# Patient Record
Sex: FEMALE | Race: BLACK OR AFRICAN AMERICAN | NOT HISPANIC OR LATINO | Employment: OTHER | URBAN - NONMETROPOLITAN AREA
[De-identification: names, ages, dates, MRNs, and addresses within clinical notes are randomized per-mention and may not be internally consistent; named-entity substitution may affect disease eponyms.]

---

## 2017-04-17 ENCOUNTER — OFFICE VISIT (OUTPATIENT)
Dept: NEUROSURGERY | Facility: CLINIC | Age: 72
End: 2017-04-17

## 2017-04-17 VITALS
SYSTOLIC BLOOD PRESSURE: 140 MMHG | HEIGHT: 63 IN | WEIGHT: 165 LBS | DIASTOLIC BLOOD PRESSURE: 78 MMHG | BODY MASS INDEX: 29.23 KG/M2

## 2017-04-17 DIAGNOSIS — Z78.9 NONSMOKER: ICD-10-CM

## 2017-04-17 DIAGNOSIS — M54.12 CERVICAL RADICULOPATHY: ICD-10-CM

## 2017-04-17 DIAGNOSIS — M47.812 CERVICAL SPONDYLOSIS WITHOUT MYELOPATHY: Primary | ICD-10-CM

## 2017-04-17 DIAGNOSIS — M50.30 DEGENERATION OF CERVICAL INTERVERTEBRAL DISC: ICD-10-CM

## 2017-04-17 PROCEDURE — 99203 OFFICE O/P NEW LOW 30 MIN: CPT | Performed by: NEUROLOGICAL SURGERY

## 2017-04-17 RX ORDER — VALSARTAN AND HYDROCHLOROTHIAZIDE 320; 25 MG/1; MG/1
1 TABLET, FILM COATED ORAL DAILY
COMMUNITY
Start: 2017-03-11

## 2017-04-17 RX ORDER — POTASSIUM CHLORIDE 750 MG/1
10 TABLET, EXTENDED RELEASE ORAL DAILY
COMMUNITY
Start: 2017-03-11 | End: 2020-12-21

## 2017-04-17 RX ORDER — FENOFIBRATE 160 MG/1
160 TABLET ORAL DAILY
COMMUNITY
Start: 2017-03-11 | End: 2018-05-03

## 2017-04-17 NOTE — PROGRESS NOTES
Patient: Arnie Ross  : 1945    Primary Care Provider: Nando Pope MD    Referring Provider: Nando Pope MD         History    Chief Complaint: [] Neck pain and left upper trapezius pain.  This developed about 1 year ago.  She was getting into a van and [place] which was a shuttle for their use on her car was being repaired apparently she bumped her head on the door.  She has had 2 episodes of physical therapy.  Some of which helped especially the traction.  She has no further radicular pain and is not aware of weakness or numbness.  She has had no history of any other injury.  Pain that comes around to the right side of the face less pain to the back of the head.  An MRI was done in [place].  I see that on a CD.  There  is spur disc complex C5 6 to the left with some narrowing of foramen                                                                                                                                                                                                                                                                                                                                                                                                                                                                                                               History of Present Illness: []    Review of Systems   Constitutional: Negative.    Eyes: Negative.         Glasses, prior cataract  op   Respiratory: Negative.    Cardiovascular: Negative.    Gastrointestinal: Negative.    Endocrine: Negative.    Genitourinary: Negative.    Musculoskeletal: Positive for arthralgias and neck pain.   Skin: Negative.    Allergic/Immunologic: Negative.    Neurological: Positive for headaches.   Hematological: Negative.    Psychiatric/Behavioral: Negative.    All other systems reviewed and are negative.      Past Medical History:   Diagnosis Date   • Hypertension        Family History   Problem  Relation Age of Onset   • Cancer Sister    • Diabetes Sister    • Cancer Brother        Social History   Substance Use Topics   • Smoking status: Never Smoker   • Smokeless tobacco: None   • Alcohol use No       Past Surgical History:   Procedure Laterality Date   • CATARACT EXTRACTION     • D&C CERVICAL BIOPSY      X 2   • GALLBLADDER SURGERY  2011       Augmentin [amoxicillin-pot clavulanate]; Codeine; and Sulfa antibiotics      Physical Exam:   [] awake alert and oriented properly she is well dressed.  Pleasant.  Chest clear heart regular no murmur or bruit.  Oral airway patent.  She has impaired range of motion lateral flexion of neck seemingly worse lateral rotation to right.  No palpable spasm or tenderness.  Full range of motion of the shoulders.  Cranial nerves II through XII intact.  Very mild weakness of pronation and brachial radialis on the left compared to the right.  Deep tendon reflexes are 1-2+ and equal biceps brachial radialis triceps knees and ankles.  Sensory intact.  Cerebellar function intact.      Medical Decision Making    Data Review:   []MRI from [facility] cervical spine.  Spur disc complex C5 6 left     Diagnosis: cervical spondylosis she does not have symptoms of strong radiculopathy.  She did improve previously with cervical traction.    []    Treatment Options:   []see above    1. Cervical spondylosis without myelopathy    2. Degeneration of cervical intervertebral disc    3. Cervical radiculopathy    4. Nonsmoker

## 2017-06-21 ENCOUNTER — OFFICE VISIT (OUTPATIENT)
Dept: NEUROSURGERY | Facility: CLINIC | Age: 72
End: 2017-06-21

## 2017-06-21 VITALS
WEIGHT: 168 LBS | SYSTOLIC BLOOD PRESSURE: 140 MMHG | HEIGHT: 63 IN | DIASTOLIC BLOOD PRESSURE: 70 MMHG | BODY MASS INDEX: 29.77 KG/M2

## 2017-06-21 DIAGNOSIS — M54.12 CERVICAL RADICULOPATHY: ICD-10-CM

## 2017-06-21 DIAGNOSIS — Z78.9 NONSMOKER: ICD-10-CM

## 2017-06-21 DIAGNOSIS — M50.30 DEGENERATION OF CERVICAL INTERVERTEBRAL DISC: Primary | ICD-10-CM

## 2017-06-21 DIAGNOSIS — M47.812 CERVICAL SPONDYLOSIS WITHOUT MYELOPATHY: ICD-10-CM

## 2017-06-21 PROCEDURE — 99212 OFFICE O/P EST SF 10 MIN: CPT | Performed by: NEUROLOGICAL SURGERY

## 2017-06-21 NOTE — PROGRESS NOTES
Patient ID: Arnie Ross is a 72 y.o. female here today for []follow-up for neck pain.    HPI:  []She originally had symptoms towards the left shoulder.  That has improved with exercise program and therapy.  An MRI was reviewed.  She had a tiny spur disc complex C5 6 to the left not compressing the cord nor the exiting nerve root .  She is improved    The following portions of the patient's history were reviewed and updated as appropriate: allergies, current medications, past family history, past medical history, past social history, past surgical history and problem list.    Review of Systems   Constitutional: Negative.    Eyes: Negative.    Respiratory: Negative.    Cardiovascular: Negative.    Gastrointestinal: Negative.    Endocrine: Negative.    Genitourinary: Negative.    Musculoskeletal: Positive for arthralgias and neck pain.   Skin: Negative.    Allergic/Immunologic: Negative.    Neurological: Positive for headaches.   Hematological: Negative.    Psychiatric/Behavioral: Negative.    All other systems reviewed and are negative.      Past Medical History:   Diagnosis Date   • Hypertension        Family History   Problem Relation Age of Onset   • Cancer Sister    • Diabetes Sister    • Cancer Brother        Social History   Substance Use Topics   • Smoking status: Never Smoker   • Smokeless tobacco: None   • Alcohol use No       Past Surgical History:   Procedure Laterality Date   • CATARACT EXTRACTION     • D&C CERVICAL BIOPSY      X 2   • GALLBLADDER SURGERY  2011       Augmentin [amoxicillin-pot clavulanate]; Codeine; and Sulfa antibiotics      Physical Exam:  []  Awake alert oriented properly.  Chest clear heart regular.  She has very good range of motion of her neck and shoulders today.  Good equal strength reflexes 1+ and equal no sensory complaints  Review of Radiographic Studies:  []I reviewed the MRI again described above of the cervical spine    Medical Decision Making:  []  She is improved she  does not have a condition that needs any surgical intervention she understands how to take care of her manage her neck properly  1. Degeneration of cervical intervertebral disc    2. Cervical spondylosis without myelopathy    3. Cervical radiculopathy    4. Nonsmoker         Arnie was seen today for neck pain.    Diagnoses and all orders for this visit:    Degeneration of cervical intervertebral disc    Cervical spondylosis without myelopathy    Cervical radiculopathy    Nonsmoker        Return if symptoms worsen or fail to improve.

## 2018-02-26 ENCOUNTER — HOSPITAL ENCOUNTER (INPATIENT)
Facility: HOSPITAL | Age: 73
LOS: 9 days | Discharge: HOME | End: 2018-03-07
Attending: FAMILY MEDICINE | Admitting: SPECIALIST

## 2018-02-26 ENCOUNTER — APPOINTMENT (OUTPATIENT)
Dept: CT IMAGING | Facility: HOSPITAL | Age: 73
End: 2018-02-26

## 2018-02-26 DIAGNOSIS — K35.32 APPENDICITIS WITH PERFORATION: Primary | ICD-10-CM

## 2018-02-26 LAB
ALBUMIN SERPL-MCNC: 3.9 G/DL (ref 3.5–5)
ALBUMIN/GLOB SERPL: 1 G/DL (ref 1.1–2.5)
ALP SERPL-CCNC: 104 U/L (ref 24–120)
ALT SERPL W P-5'-P-CCNC: 16 U/L (ref 0–54)
AMYLASE SERPL-CCNC: 57 U/L (ref 30–110)
ANION GAP SERPL CALCULATED.3IONS-SCNC: 11 MMOL/L (ref 4–13)
AST SERPL-CCNC: 41 U/L (ref 7–45)
BASOPHILS # BLD AUTO: 0.03 10*3/MM3 (ref 0–0.2)
BASOPHILS NFR BLD AUTO: 0.2 % (ref 0–2)
BILIRUB SERPL-MCNC: 1 MG/DL (ref 0.1–1)
BILIRUB UR QL STRIP: NEGATIVE
BUN BLD-MCNC: 13 MG/DL (ref 5–21)
BUN/CREAT SERPL: 13.1 (ref 7–25)
CALCIUM SPEC-SCNC: 9.8 MG/DL (ref 8.4–10.4)
CHLORIDE SERPL-SCNC: 99 MMOL/L (ref 98–110)
CLARITY UR: CLEAR
CO2 SERPL-SCNC: 29 MMOL/L (ref 24–31)
COLOR UR: YELLOW
CREAT BLD-MCNC: 0.99 MG/DL (ref 0.5–1.4)
DEPRECATED RDW RBC AUTO: 39.8 FL (ref 40–54)
EOSINOPHIL # BLD AUTO: 0.06 10*3/MM3 (ref 0–0.7)
EOSINOPHIL NFR BLD AUTO: 0.4 % (ref 0–4)
ERYTHROCYTE [DISTWIDTH] IN BLOOD BY AUTOMATED COUNT: 11.8 % (ref 12–15)
GFR SERPL CREATININE-BSD FRML MDRD: 67 ML/MIN/1.73
GLOBULIN UR ELPH-MCNC: 4.1 GM/DL
GLUCOSE BLD-MCNC: 109 MG/DL (ref 70–100)
GLUCOSE UR STRIP-MCNC: NEGATIVE MG/DL
HCT VFR BLD AUTO: 32 % (ref 37–47)
HGB BLD-MCNC: 10.9 G/DL (ref 12–16)
HGB UR QL STRIP.AUTO: NEGATIVE
IMM GRANULOCYTES # BLD: 0.05 10*3/MM3 (ref 0–0.03)
IMM GRANULOCYTES NFR BLD: 0.3 % (ref 0–5)
KETONES UR QL STRIP: NEGATIVE
LEUKOCYTE ESTERASE UR QL STRIP.AUTO: NEGATIVE
LIPASE SERPL-CCNC: 37 U/L (ref 23–203)
LYMPHOCYTES # BLD AUTO: 1.91 10*3/MM3 (ref 0.72–4.86)
LYMPHOCYTES NFR BLD AUTO: 13.4 % (ref 15–45)
MCH RBC QN AUTO: 31.2 PG (ref 28–32)
MCHC RBC AUTO-ENTMCNC: 34.1 G/DL (ref 33–36)
MCV RBC AUTO: 91.7 FL (ref 82–98)
MONOCYTES # BLD AUTO: 1.14 10*3/MM3 (ref 0.19–1.3)
MONOCYTES NFR BLD AUTO: 8 % (ref 4–12)
NEUTROPHILS # BLD AUTO: 11.1 10*3/MM3 (ref 1.87–8.4)
NEUTROPHILS NFR BLD AUTO: 77.7 % (ref 39–78)
NITRITE UR QL STRIP: NEGATIVE
NRBC BLD MANUAL-RTO: 0 /100 WBC (ref 0–0)
PH UR STRIP.AUTO: 6 [PH] (ref 5–8)
PLATELET # BLD AUTO: 411 10*3/MM3 (ref 130–400)
PMV BLD AUTO: 10.1 FL (ref 6–12)
POTASSIUM BLD-SCNC: 3.9 MMOL/L (ref 3.5–5.3)
PROT SERPL-MCNC: 8 G/DL (ref 6.3–8.7)
PROT UR QL STRIP: NEGATIVE
RBC # BLD AUTO: 3.49 10*6/MM3 (ref 4.2–5.4)
SODIUM BLD-SCNC: 139 MMOL/L (ref 135–145)
SP GR UR STRIP: 1.03 (ref 1–1.03)
UROBILINOGEN UR QL STRIP: NORMAL
WBC NRBC COR # BLD: 14.29 10*3/MM3 (ref 4.8–10.8)

## 2018-02-26 PROCEDURE — 80053 COMPREHEN METABOLIC PANEL: CPT | Performed by: EMERGENCY MEDICINE

## 2018-02-26 PROCEDURE — 83690 ASSAY OF LIPASE: CPT | Performed by: EMERGENCY MEDICINE

## 2018-02-26 PROCEDURE — 81003 URINALYSIS AUTO W/O SCOPE: CPT | Performed by: EMERGENCY MEDICINE

## 2018-02-26 PROCEDURE — 85025 COMPLETE CBC W/AUTO DIFF WBC: CPT | Performed by: EMERGENCY MEDICINE

## 2018-02-26 PROCEDURE — 25010000002 MORPHINE PER 10 MG: Performed by: EMERGENCY MEDICINE

## 2018-02-26 PROCEDURE — 94799 UNLISTED PULMONARY SVC/PX: CPT

## 2018-02-26 PROCEDURE — 74176 CT ABD & PELVIS W/O CONTRAST: CPT

## 2018-02-26 PROCEDURE — 25010000002 ERTAPENEM PER 500 MG: Performed by: SPECIALIST

## 2018-02-26 PROCEDURE — 25010000002 ONDANSETRON PER 1 MG: Performed by: EMERGENCY MEDICINE

## 2018-02-26 PROCEDURE — 82150 ASSAY OF AMYLASE: CPT | Performed by: EMERGENCY MEDICINE

## 2018-02-26 PROCEDURE — 99284 EMERGENCY DEPT VISIT MOD MDM: CPT

## 2018-02-26 RX ORDER — CIPROFLOXACIN 250 MG/1
250 TABLET, FILM COATED ORAL 2 TIMES DAILY
Status: ON HOLD | COMMUNITY
End: 2018-03-01

## 2018-02-26 RX ORDER — MELATONIN
2000 DAILY
COMMUNITY

## 2018-02-26 RX ORDER — SODIUM CHLORIDE 9 MG/ML
125 INJECTION, SOLUTION INTRAVENOUS CONTINUOUS
Status: DISCONTINUED | OUTPATIENT
Start: 2018-02-26 | End: 2018-02-26

## 2018-02-26 RX ORDER — ONDANSETRON 2 MG/ML
4 INJECTION INTRAMUSCULAR; INTRAVENOUS EVERY 4 HOURS PRN
Status: DISCONTINUED | OUTPATIENT
Start: 2018-02-26 | End: 2018-03-07 | Stop reason: HOSPADM

## 2018-02-26 RX ORDER — ONDANSETRON 2 MG/ML
4 INJECTION INTRAMUSCULAR; INTRAVENOUS ONCE
Status: COMPLETED | OUTPATIENT
Start: 2018-02-26 | End: 2018-02-26

## 2018-02-26 RX ORDER — MORPHINE SULFATE 4 MG/ML
4 INJECTION, SOLUTION INTRAMUSCULAR; INTRAVENOUS EVERY 4 HOURS PRN
Status: DISCONTINUED | OUTPATIENT
Start: 2018-02-26 | End: 2018-02-27

## 2018-02-26 RX ORDER — ASPIRIN 81 MG/1
81 TABLET ORAL NIGHTLY
COMMUNITY

## 2018-02-26 RX ORDER — SODIUM CHLORIDE, SODIUM LACTATE, POTASSIUM CHLORIDE, CALCIUM CHLORIDE 600; 310; 30; 20 MG/100ML; MG/100ML; MG/100ML; MG/100ML
50 INJECTION, SOLUTION INTRAVENOUS CONTINUOUS
Status: DISCONTINUED | OUTPATIENT
Start: 2018-02-26 | End: 2018-03-05

## 2018-02-26 RX ORDER — VITAMIN E 268 MG
400 CAPSULE ORAL DAILY
COMMUNITY

## 2018-02-26 RX ORDER — CHLORAL HYDRATE 500 MG
1000 CAPSULE ORAL
COMMUNITY
End: 2020-12-21

## 2018-02-26 RX ORDER — VALSARTAN AND HYDROCHLOROTHIAZIDE 160; 12.5 MG/1; MG/1
1 TABLET, FILM COATED ORAL DAILY
Status: DISCONTINUED | OUTPATIENT
Start: 2018-02-27 | End: 2018-02-27

## 2018-02-26 RX ORDER — NAPROXEN 500 MG/1
500 TABLET ORAL 2 TIMES DAILY PRN
COMMUNITY
End: 2020-12-21

## 2018-02-26 RX ORDER — MORPHINE SULFATE 4 MG/ML
4 INJECTION, SOLUTION INTRAMUSCULAR; INTRAVENOUS ONCE
Status: COMPLETED | OUTPATIENT
Start: 2018-02-26 | End: 2018-02-26

## 2018-02-26 RX ORDER — FEXOFENADINE HCL AND PSEUDOEPHEDRINE HCI 60; 120 MG/1; MG/1
1 TABLET, EXTENDED RELEASE ORAL EVERY 12 HOURS PRN
COMMUNITY

## 2018-02-26 RX ADMIN — SODIUM CHLORIDE, POTASSIUM CHLORIDE, SODIUM LACTATE AND CALCIUM CHLORIDE 125 ML/HR: 600; 310; 30; 20 INJECTION, SOLUTION INTRAVENOUS at 22:17

## 2018-02-26 RX ADMIN — SODIUM CHLORIDE 1 G: 900 INJECTION INTRAVENOUS at 20:30

## 2018-02-26 RX ADMIN — SODIUM CHLORIDE 125 ML/HR: 9 INJECTION, SOLUTION INTRAVENOUS at 19:11

## 2018-02-26 RX ADMIN — ONDANSETRON 4 MG: 2 INJECTION INTRAMUSCULAR; INTRAVENOUS at 20:44

## 2018-02-26 RX ADMIN — MORPHINE SULFATE 4 MG: 4 INJECTION, SOLUTION INTRAMUSCULAR; INTRAVENOUS at 20:44

## 2018-02-26 RX ADMIN — METRONIDAZOLE 500 MG: 500 INJECTION, SOLUTION INTRAVENOUS at 22:19

## 2018-02-27 LAB
ANION GAP SERPL CALCULATED.3IONS-SCNC: 13 MMOL/L (ref 4–13)
BUN BLD-MCNC: 13 MG/DL (ref 5–21)
BUN/CREAT SERPL: 13.3 (ref 7–25)
CALCIUM SPEC-SCNC: 9 MG/DL (ref 8.4–10.4)
CHLORIDE SERPL-SCNC: 101 MMOL/L (ref 98–110)
CO2 SERPL-SCNC: 28 MMOL/L (ref 24–31)
CREAT BLD-MCNC: 0.98 MG/DL (ref 0.5–1.4)
DEPRECATED RDW RBC AUTO: 40 FL (ref 40–54)
ERYTHROCYTE [DISTWIDTH] IN BLOOD BY AUTOMATED COUNT: 11.8 % (ref 12–15)
GFR SERPL CREATININE-BSD FRML MDRD: 68 ML/MIN/1.73
GLUCOSE BLD-MCNC: 101 MG/DL (ref 70–100)
HCT VFR BLD AUTO: 28.4 % (ref 37–47)
HGB BLD-MCNC: 9.6 G/DL (ref 12–16)
MAGNESIUM SERPL-MCNC: 2.2 MG/DL (ref 1.4–2.2)
MCH RBC QN AUTO: 31.6 PG (ref 28–32)
MCHC RBC AUTO-ENTMCNC: 33.8 G/DL (ref 33–36)
MCV RBC AUTO: 93.4 FL (ref 82–98)
PHOSPHATE SERPL-MCNC: 4.5 MG/DL (ref 2.5–4.5)
PLATELET # BLD AUTO: 370 10*3/MM3 (ref 130–400)
PMV BLD AUTO: 10 FL (ref 6–12)
POTASSIUM BLD-SCNC: 4.2 MMOL/L (ref 3.5–5.3)
RBC # BLD AUTO: 3.04 10*6/MM3 (ref 4.2–5.4)
SODIUM BLD-SCNC: 142 MMOL/L (ref 135–145)
WBC NRBC COR # BLD: 14.61 10*3/MM3 (ref 4.8–10.8)

## 2018-02-27 PROCEDURE — 80048 BASIC METABOLIC PNL TOTAL CA: CPT | Performed by: SPECIALIST

## 2018-02-27 PROCEDURE — 85027 COMPLETE CBC AUTOMATED: CPT | Performed by: SPECIALIST

## 2018-02-27 PROCEDURE — 84100 ASSAY OF PHOSPHORUS: CPT | Performed by: SPECIALIST

## 2018-02-27 PROCEDURE — 25010000002 MORPHINE PER 10 MG: Performed by: SPECIALIST

## 2018-02-27 PROCEDURE — 25010000002 ONDANSETRON PER 1 MG: Performed by: SPECIALIST

## 2018-02-27 PROCEDURE — 25010000002 ERTAPENEM PER 500 MG: Performed by: SPECIALIST

## 2018-02-27 PROCEDURE — 83735 ASSAY OF MAGNESIUM: CPT | Performed by: SPECIALIST

## 2018-02-27 PROCEDURE — 36415 COLL VENOUS BLD VENIPUNCTURE: CPT | Performed by: SPECIALIST

## 2018-02-27 RX ORDER — MORPHINE SULFATE 4 MG/ML
4 INJECTION, SOLUTION INTRAMUSCULAR; INTRAVENOUS
Status: DISCONTINUED | OUTPATIENT
Start: 2018-02-27 | End: 2018-03-07 | Stop reason: HOSPADM

## 2018-02-27 RX ADMIN — ONDANSETRON 4 MG: 2 INJECTION INTRAMUSCULAR; INTRAVENOUS at 12:35

## 2018-02-27 RX ADMIN — METRONIDAZOLE 500 MG: 500 INJECTION, SOLUTION INTRAVENOUS at 08:22

## 2018-02-27 RX ADMIN — ONDANSETRON 4 MG: 2 INJECTION INTRAMUSCULAR; INTRAVENOUS at 08:17

## 2018-02-27 RX ADMIN — SODIUM CHLORIDE, POTASSIUM CHLORIDE, SODIUM LACTATE AND CALCIUM CHLORIDE 125 ML/HR: 600; 310; 30; 20 INJECTION, SOLUTION INTRAVENOUS at 15:52

## 2018-02-27 RX ADMIN — ONDANSETRON 4 MG: 2 INJECTION INTRAMUSCULAR; INTRAVENOUS at 18:01

## 2018-02-27 RX ADMIN — METRONIDAZOLE 500 MG: 500 INJECTION, SOLUTION INTRAVENOUS at 15:53

## 2018-02-27 RX ADMIN — METRONIDAZOLE 500 MG: 500 INJECTION, SOLUTION INTRAVENOUS at 21:31

## 2018-02-27 RX ADMIN — SODIUM CHLORIDE, POTASSIUM CHLORIDE, SODIUM LACTATE AND CALCIUM CHLORIDE 125 ML/HR: 600; 310; 30; 20 INJECTION, SOLUTION INTRAVENOUS at 08:22

## 2018-02-27 RX ADMIN — SODIUM CHLORIDE, POTASSIUM CHLORIDE, SODIUM LACTATE AND CALCIUM CHLORIDE 1000 ML: 600; 310; 30; 20 INJECTION, SOLUTION INTRAVENOUS at 14:10

## 2018-02-27 RX ADMIN — MORPHINE SULFATE 4 MG: 4 INJECTION, SOLUTION INTRAMUSCULAR; INTRAVENOUS at 08:17

## 2018-02-27 RX ADMIN — SODIUM CHLORIDE 1 G: 900 INJECTION INTRAVENOUS at 19:45

## 2018-02-27 RX ADMIN — METRONIDAZOLE 500 MG: 500 INJECTION, SOLUTION INTRAVENOUS at 03:46

## 2018-02-28 LAB
ANION GAP SERPL CALCULATED.3IONS-SCNC: 15 MMOL/L (ref 4–13)
BUN BLD-MCNC: 14 MG/DL (ref 5–21)
BUN/CREAT SERPL: 18.2 (ref 7–25)
CALCIUM SPEC-SCNC: 9.1 MG/DL (ref 8.4–10.4)
CHLORIDE SERPL-SCNC: 105 MMOL/L (ref 98–110)
CO2 SERPL-SCNC: 21 MMOL/L (ref 24–31)
CREAT BLD-MCNC: 0.77 MG/DL (ref 0.5–1.4)
DEPRECATED RDW RBC AUTO: 41 FL (ref 40–54)
ERYTHROCYTE [DISTWIDTH] IN BLOOD BY AUTOMATED COUNT: 11.9 % (ref 12–15)
GFR SERPL CREATININE-BSD FRML MDRD: 90 ML/MIN/1.73
GLUCOSE BLD-MCNC: 73 MG/DL (ref 70–100)
HCT VFR BLD AUTO: 28.1 % (ref 37–47)
HGB BLD-MCNC: 9.3 G/DL (ref 12–16)
MCH RBC QN AUTO: 31.3 PG (ref 28–32)
MCHC RBC AUTO-ENTMCNC: 33.1 G/DL (ref 33–36)
MCV RBC AUTO: 94.6 FL (ref 82–98)
PLATELET # BLD AUTO: 344 10*3/MM3 (ref 130–400)
PMV BLD AUTO: 11.1 FL (ref 6–12)
POTASSIUM BLD-SCNC: 4.2 MMOL/L (ref 3.5–5.3)
RBC # BLD AUTO: 2.97 10*6/MM3 (ref 4.2–5.4)
SODIUM BLD-SCNC: 141 MMOL/L (ref 135–145)
WBC NRBC COR # BLD: 14.23 10*3/MM3 (ref 4.8–10.8)

## 2018-02-28 PROCEDURE — 25010000002 MORPHINE PER 10 MG: Performed by: SPECIALIST

## 2018-02-28 PROCEDURE — 25010000002 ENOXAPARIN PER 10 MG: Performed by: SPECIALIST

## 2018-02-28 PROCEDURE — 85027 COMPLETE CBC AUTOMATED: CPT | Performed by: SPECIALIST

## 2018-02-28 PROCEDURE — 36415 COLL VENOUS BLD VENIPUNCTURE: CPT | Performed by: SPECIALIST

## 2018-02-28 PROCEDURE — 80048 BASIC METABOLIC PNL TOTAL CA: CPT | Performed by: SPECIALIST

## 2018-02-28 PROCEDURE — 25010000002 ERTAPENEM PER 500 MG: Performed by: SPECIALIST

## 2018-02-28 RX ORDER — SUCRALFATE ORAL 1 G/10ML
1 SUSPENSION ORAL EVERY 6 HOURS SCHEDULED
Status: DISCONTINUED | OUTPATIENT
Start: 2018-03-01 | End: 2018-03-05

## 2018-02-28 RX ORDER — SIMETHICONE 80 MG
80 TABLET,CHEWABLE ORAL 4 TIMES DAILY PRN
Status: DISCONTINUED | OUTPATIENT
Start: 2018-02-28 | End: 2018-03-05

## 2018-02-28 RX ORDER — MAGNESIUM HYDROXIDE/ALUMINUM HYDROXICE/SIMETHICONE 120; 1200; 1200 MG/30ML; MG/30ML; MG/30ML
20 SUSPENSION ORAL EVERY 6 HOURS PRN
Status: DISCONTINUED | OUTPATIENT
Start: 2018-02-28 | End: 2018-03-05

## 2018-02-28 RX ORDER — FAMOTIDINE 10 MG/ML
20 INJECTION, SOLUTION INTRAVENOUS EVERY 12 HOURS SCHEDULED
Status: DISCONTINUED | OUTPATIENT
Start: 2018-02-28 | End: 2018-03-05 | Stop reason: SDUPTHER

## 2018-02-28 RX ORDER — FAMOTIDINE 20 MG/1
20 TABLET, FILM COATED ORAL EVERY 12 HOURS SCHEDULED
Status: DISCONTINUED | OUTPATIENT
Start: 2018-02-28 | End: 2018-03-01

## 2018-02-28 RX ADMIN — SODIUM CHLORIDE, POTASSIUM CHLORIDE, SODIUM LACTATE AND CALCIUM CHLORIDE 125 ML/HR: 600; 310; 30; 20 INJECTION, SOLUTION INTRAVENOUS at 15:22

## 2018-02-28 RX ADMIN — ENOXAPARIN SODIUM 40 MG: 100 INJECTION SUBCUTANEOUS at 05:15

## 2018-02-28 RX ADMIN — METRONIDAZOLE 500 MG: 500 INJECTION, SOLUTION INTRAVENOUS at 22:23

## 2018-02-28 RX ADMIN — FAMOTIDINE 20 MG: 10 INJECTION INTRAVENOUS at 22:24

## 2018-02-28 RX ADMIN — SODIUM CHLORIDE 1 G: 900 INJECTION INTRAVENOUS at 20:41

## 2018-02-28 RX ADMIN — MORPHINE SULFATE 4 MG: 4 INJECTION, SOLUTION INTRAMUSCULAR; INTRAVENOUS at 11:01

## 2018-02-28 RX ADMIN — SODIUM CHLORIDE, POTASSIUM CHLORIDE, SODIUM LACTATE AND CALCIUM CHLORIDE 125 ML/HR: 600; 310; 30; 20 INJECTION, SOLUTION INTRAVENOUS at 03:27

## 2018-02-28 RX ADMIN — METRONIDAZOLE 500 MG: 500 INJECTION, SOLUTION INTRAVENOUS at 03:27

## 2018-02-28 RX ADMIN — METRONIDAZOLE 500 MG: 500 INJECTION, SOLUTION INTRAVENOUS at 15:22

## 2018-02-28 RX ADMIN — METRONIDAZOLE 500 MG: 500 INJECTION, SOLUTION INTRAVENOUS at 08:55

## 2018-03-01 LAB
ANION GAP SERPL CALCULATED.3IONS-SCNC: 15 MMOL/L (ref 4–13)
BUN BLD-MCNC: 16 MG/DL (ref 5–21)
BUN/CREAT SERPL: 24.2 (ref 7–25)
CALCIUM SPEC-SCNC: 8.9 MG/DL (ref 8.4–10.4)
CHLORIDE SERPL-SCNC: 104 MMOL/L (ref 98–110)
CO2 SERPL-SCNC: 24 MMOL/L (ref 24–31)
CREAT BLD-MCNC: 0.66 MG/DL (ref 0.5–1.4)
DEPRECATED RDW RBC AUTO: 41.1 FL (ref 40–54)
ERYTHROCYTE [DISTWIDTH] IN BLOOD BY AUTOMATED COUNT: 11.9 % (ref 12–15)
GFR SERPL CREATININE-BSD FRML MDRD: 107 ML/MIN/1.73
GLUCOSE BLD-MCNC: 88 MG/DL (ref 70–100)
HCT VFR BLD AUTO: 26.3 % (ref 37–47)
HGB BLD-MCNC: 8.8 G/DL (ref 12–16)
MCH RBC QN AUTO: 31 PG (ref 28–32)
MCHC RBC AUTO-ENTMCNC: 33.5 G/DL (ref 33–36)
MCV RBC AUTO: 92.6 FL (ref 82–98)
PLATELET # BLD AUTO: 386 10*3/MM3 (ref 130–400)
PMV BLD AUTO: 10.9 FL (ref 6–12)
POTASSIUM BLD-SCNC: 3.6 MMOL/L (ref 3.5–5.3)
RBC # BLD AUTO: 2.84 10*6/MM3 (ref 4.2–5.4)
SODIUM BLD-SCNC: 143 MMOL/L (ref 135–145)
WBC NRBC COR # BLD: 12.91 10*3/MM3 (ref 4.8–10.8)

## 2018-03-01 PROCEDURE — 25010000002 ERTAPENEM PER 500 MG: Performed by: SPECIALIST

## 2018-03-01 PROCEDURE — 25010000002 ENOXAPARIN PER 10 MG: Performed by: SPECIALIST

## 2018-03-01 PROCEDURE — 25010000002 MORPHINE PER 10 MG: Performed by: SPECIALIST

## 2018-03-01 PROCEDURE — 85027 COMPLETE CBC AUTOMATED: CPT | Performed by: SPECIALIST

## 2018-03-01 PROCEDURE — 02HV33Z INSERTION OF INFUSION DEVICE INTO SUPERIOR VENA CAVA, PERCUTANEOUS APPROACH: ICD-10-PCS | Performed by: SPECIALIST

## 2018-03-01 PROCEDURE — C1751 CATH, INF, PER/CENT/MIDLINE: HCPCS

## 2018-03-01 PROCEDURE — 80048 BASIC METABOLIC PNL TOTAL CA: CPT | Performed by: SPECIALIST

## 2018-03-01 PROCEDURE — 25010000002 ONDANSETRON PER 1 MG: Performed by: SPECIALIST

## 2018-03-01 RX ORDER — PHENOL 1.4 %
600 AEROSOL, SPRAY (ML) MUCOUS MEMBRANE DAILY
COMMUNITY

## 2018-03-01 RX ORDER — LIDOCAINE HYDROCHLORIDE 10 MG/ML
1 INJECTION, SOLUTION EPIDURAL; INFILTRATION; INTRACAUDAL; PERINEURAL ONCE
Status: COMPLETED | OUTPATIENT
Start: 2018-03-01 | End: 2018-03-01

## 2018-03-01 RX ADMIN — SUCRALFATE 1 G: 1 SUSPENSION ORAL at 17:29

## 2018-03-01 RX ADMIN — METRONIDAZOLE 500 MG: 500 INJECTION, SOLUTION INTRAVENOUS at 16:00

## 2018-03-01 RX ADMIN — SODIUM CHLORIDE, POTASSIUM CHLORIDE, SODIUM LACTATE AND CALCIUM CHLORIDE 125 ML/HR: 600; 310; 30; 20 INJECTION, SOLUTION INTRAVENOUS at 01:56

## 2018-03-01 RX ADMIN — METRONIDAZOLE 500 MG: 500 INJECTION, SOLUTION INTRAVENOUS at 09:40

## 2018-03-01 RX ADMIN — SUCRALFATE 1 G: 1 SUSPENSION ORAL at 23:25

## 2018-03-01 RX ADMIN — SUCRALFATE 1 G: 1 SUSPENSION ORAL at 06:36

## 2018-03-01 RX ADMIN — MORPHINE SULFATE 4 MG: 4 INJECTION, SOLUTION INTRAMUSCULAR; INTRAVENOUS at 14:08

## 2018-03-01 RX ADMIN — METRONIDAZOLE 500 MG: 500 INJECTION, SOLUTION INTRAVENOUS at 03:53

## 2018-03-01 RX ADMIN — SODIUM CHLORIDE, POTASSIUM CHLORIDE, SODIUM LACTATE AND CALCIUM CHLORIDE 125 ML/HR: 600; 310; 30; 20 INJECTION, SOLUTION INTRAVENOUS at 10:58

## 2018-03-01 RX ADMIN — FAMOTIDINE 20 MG: 10 INJECTION INTRAVENOUS at 08:09

## 2018-03-01 RX ADMIN — FAMOTIDINE 20 MG: 10 INJECTION INTRAVENOUS at 21:20

## 2018-03-01 RX ADMIN — SUCRALFATE 1 G: 1 SUSPENSION ORAL at 11:50

## 2018-03-01 RX ADMIN — SODIUM CHLORIDE 1 G: 900 INJECTION INTRAVENOUS at 19:47

## 2018-03-01 RX ADMIN — ENOXAPARIN SODIUM 40 MG: 100 INJECTION SUBCUTANEOUS at 06:36

## 2018-03-01 RX ADMIN — SUCRALFATE 1 G: 1 SUSPENSION ORAL at 01:52

## 2018-03-01 RX ADMIN — LIDOCAINE HYDROCHLORIDE 1 ML: 10 INJECTION, SOLUTION EPIDURAL; INFILTRATION; INTRACAUDAL; PERINEURAL at 14:48

## 2018-03-01 RX ADMIN — METRONIDAZOLE 500 MG: 500 INJECTION, SOLUTION INTRAVENOUS at 21:20

## 2018-03-01 RX ADMIN — SODIUM CHLORIDE, POTASSIUM CHLORIDE, SODIUM LACTATE AND CALCIUM CHLORIDE 125 ML/HR: 600; 310; 30; 20 INJECTION, SOLUTION INTRAVENOUS at 19:45

## 2018-03-01 RX ADMIN — ONDANSETRON 4 MG: 2 INJECTION INTRAMUSCULAR; INTRAVENOUS at 14:08

## 2018-03-01 NOTE — CONSULTS
Pt had 5 [place] double lumen PICC placed in left cephalic vein. Pt tolerated procedure well. 39 cm of catheter internal and 0 cm external. Tip confirmation by 3cg. All lumens flush and draw well. Sterile dressing applied.

## 2018-03-01 NOTE — PROGRESS NOTES
Jackie Grewal MD FACS  Progress Note     LOS: 3 days   Patient Care Team:  Nando Pope MD as PCP - General (Family Medicine)      Subjective     Interval History:      some nausea and continued pain.  Loose stools     Objective     Vital Signs  Temp:  [98.4 °F (36.9 °C)-99.1 °F (37.3 °C)] 99 °F (37.2 °C)  Heart Rate:  [72-79] 75  Resp:  [16-18] 16  BP: ()/(41-65) 123/60    Physical Exam:  General appearance - alert, well appearing, and in no distress  Abdomen - soft, RLQ point tenderness decreased.  Some distension      Results Review:    Lab Results (last 24 hours)     Procedure Component Value Units Date/Time    CBC (No Diff) [[id number]]  (Abnormal) Collected:  03/01/18 0421    Specimen:  Blood Updated:  03/01/18 0514     WBC 12.91 (H) 10*3/mm3      RBC 2.84 (L) 10*6/mm3      Hemoglobin 8.8 (L) g/dL      Hematocrit 26.3 (L) %      MCV 92.6 fL      MCH 31.0 pg      MCHC 33.5 g/dL      RDW 11.9 (L) %      RDW-SD 41.1 fl      MPV 10.9 fL      Platelets 386 10*3/mm3     Basic Metabolic Panel [[id number]]  (Abnormal) Collected:  03/01/18 0421    Specimen:  Blood Updated:  03/01/18 0528     Glucose 88 mg/dL      BUN 16 mg/dL      Creatinine 0.66 mg/dL      Sodium 143 mmol/L      Potassium 3.6 mmol/L      Chloride 104 mmol/L      CO2 24.0 mmol/L      Calcium 8.9 mg/dL      eGFR  African Amer 107 mL/min/1.73      BUN/Creatinine Ratio 24.2     Anion Gap 15.0 (H) mmol/L     Narrative:       The MDRD GFR formula is only valid for adults with stable renal function between ages 18 and 70.        Imaging Results (last 24 hours)     ** No results found for the last 24 hours. **            Assessment/Plan       She has not really eaten for three weeks.  TPN will be ordered.  Serial examinations and abx will be continued.      Jackie Grewal MD  03/01/18  1:23 PM

## 2018-03-01 NOTE — PLAN OF CARE
Problem: Patient Care Overview (Adult)  Goal: Plan of Care Review  Outcome: Ongoing (interventions implemented as appropriate)   03/01/18 0447   Coping/Psychosocial Response Interventions   Plan Of Care Reviewed With patient   Patient Care Overview   Progress no change     Goal: Adult Individualization and Mutuality  Outcome: Ongoing (interventions implemented as appropriate)    Goal: Discharge Needs Assessment  Outcome: Ongoing (interventions implemented as appropriate)      Problem: Appendicitis/Appendectomy (Adult)  Goal: Signs and Symptoms of Listed Potential Problems Will be Absent or Manageable (Appendicitis/Appendectomy)  Outcome: Ongoing (interventions implemented as appropriate)

## 2018-03-02 LAB
ADV 40+41 DNA STL QL NAA+NON-PROBE: NOT DETECTED
ALBUMIN SERPL-MCNC: 2.6 G/DL (ref 3.5–5)
ALBUMIN/GLOB SERPL: 0.8 G/DL (ref 1.1–2.5)
ALP SERPL-CCNC: 66 U/L (ref 24–120)
ALT SERPL W P-5'-P-CCNC: 21 U/L (ref 0–54)
ANION GAP SERPL CALCULATED.3IONS-SCNC: 9 MMOL/L (ref 4–13)
ANION GAP SERPL CALCULATED.3IONS-SCNC: 9 MMOL/L (ref 4–13)
AST SERPL-CCNC: 36 U/L (ref 7–45)
ASTRO TYP 1-8 RNA STL QL NAA+NON-PROBE: NOT DETECTED
BILIRUB SERPL-MCNC: 0.5 MG/DL (ref 0.1–1)
BUN BLD-MCNC: 19 MG/DL (ref 5–21)
BUN BLD-MCNC: 19 MG/DL (ref 5–21)
BUN/CREAT SERPL: 26.4 (ref 7–25)
BUN/CREAT SERPL: 26.4 (ref 7–25)
C CAYETANENSIS DNA STL QL NAA+NON-PROBE: NOT DETECTED
C DIFF TOX GENS STL QL NAA+PROBE: NOT DETECTED
CA-I BLD-MCNC: 4.77 MG/DL (ref 4.6–5.4)
CALCIUM SPEC-SCNC: 8.6 MG/DL (ref 8.4–10.4)
CALCIUM SPEC-SCNC: 8.6 MG/DL (ref 8.4–10.4)
CAMPY SP DNA.DIARRHEA STL QL NAA+PROBE: NOT DETECTED
CHLORIDE SERPL-SCNC: 105 MMOL/L (ref 98–110)
CHLORIDE SERPL-SCNC: 105 MMOL/L (ref 98–110)
CHOLEST SERPL-MCNC: 72 MG/DL (ref 130–200)
CO2 SERPL-SCNC: 29 MMOL/L (ref 24–31)
CO2 SERPL-SCNC: 29 MMOL/L (ref 24–31)
CREAT BLD-MCNC: 0.72 MG/DL (ref 0.5–1.4)
CREAT BLD-MCNC: 0.72 MG/DL (ref 0.5–1.4)
CRP SERPL-MCNC: 17.31 MG/DL (ref 0–0.99)
CRYPTOSP STL CULT: NOT DETECTED
DEPRECATED RDW RBC AUTO: 42 FL (ref 40–54)
E COLI DNA SPEC QL NAA+PROBE: NOT DETECTED
E HISTOLYT AG STL-ACNC: NOT DETECTED
EAEC PAA PLAS AGGR+AATA ST NAA+NON-PRB: NOT DETECTED
EC STX1+STX2 GENES STL QL NAA+NON-PROBE: NOT DETECTED
EPEC EAE GENE STL QL NAA+NON-PROBE: NOT DETECTED
ERYTHROCYTE [DISTWIDTH] IN BLOOD BY AUTOMATED COUNT: 12.5 % (ref 12–15)
ETEC LTA+ST1A+ST1B TOX ST NAA+NON-PROBE: NOT DETECTED
G LAMBLIA DNA SPEC QL NAA+PROBE: NOT DETECTED
GFR SERPL CREATININE-BSD FRML MDRD: 97 ML/MIN/1.73
GFR SERPL CREATININE-BSD FRML MDRD: 97 ML/MIN/1.73
GLOBULIN UR ELPH-MCNC: 3.3 GM/DL
GLUCOSE BLD-MCNC: 103 MG/DL (ref 70–100)
GLUCOSE BLD-MCNC: 103 MG/DL (ref 70–100)
HCT VFR BLD AUTO: 25.5 % (ref 37–47)
HGB BLD-MCNC: 8.7 G/DL (ref 12–16)
Lab: NORMAL
MAGNESIUM SERPL-MCNC: 1.9 MG/DL (ref 1.4–2.2)
MCH RBC QN AUTO: 31.5 PG (ref 28–32)
MCHC RBC AUTO-ENTMCNC: 34.1 G/DL (ref 33–36)
MCV RBC AUTO: 92.4 FL (ref 82–98)
NOROVIRUS GI+II RNA STL QL NAA+NON-PROBE: NOT DETECTED
P SHIGELLOIDES DNA STL QL NAA+NON-PROBE: NOT DETECTED
PHOSPHATE SERPL-MCNC: 2.9 MG/DL (ref 2.5–4.5)
PLATELET # BLD AUTO: 387 10*3/MM3 (ref 130–400)
PMV BLD AUTO: 10 FL (ref 6–12)
POTASSIUM BLD-SCNC: 3.4 MMOL/L (ref 3.5–5.3)
POTASSIUM BLD-SCNC: 3.4 MMOL/L (ref 3.5–5.3)
PREALB SERPL-MCNC: 3.9 MG/DL (ref 18–36)
PROT SERPL-MCNC: 5.9 G/DL (ref 6.3–8.7)
RBC # BLD AUTO: 2.76 10*6/MM3 (ref 4.2–5.4)
RV RNA STL NAA+PROBE: NOT DETECTED
SALMONELLA DNA SPEC QL NAA+PROBE: NOT DETECTED
SAPO I+II+IV+V RNA STL QL NAA+NON-PROBE: NOT DETECTED
SHIGELLA SP+EIEC IPAH ST NAA+NON-PROBE: NOT DETECTED
SODIUM BLD-SCNC: 143 MMOL/L (ref 135–145)
SODIUM BLD-SCNC: 143 MMOL/L (ref 135–145)
TRIGL SERPL-MCNC: 82 MG/DL (ref 0–149)
V CHOLERAE DNA SPEC QL NAA+PROBE: NOT DETECTED
VIBRIO DNA SPEC NAA+PROBE: NOT DETECTED
WBC NRBC COR # BLD: 12.48 10*3/MM3 (ref 4.8–10.8)
YERSINIA STL CULT: NOT DETECTED

## 2018-03-02 PROCEDURE — 84100 ASSAY OF PHOSPHORUS: CPT | Performed by: SPECIALIST

## 2018-03-02 PROCEDURE — 25010000002 ENOXAPARIN PER 10 MG: Performed by: SPECIALIST

## 2018-03-02 PROCEDURE — 25010000002 ONDANSETRON PER 1 MG: Performed by: SPECIALIST

## 2018-03-02 PROCEDURE — 83735 ASSAY OF MAGNESIUM: CPT | Performed by: SPECIALIST

## 2018-03-02 PROCEDURE — 80053 COMPREHEN METABOLIC PANEL: CPT | Performed by: SPECIALIST

## 2018-03-02 PROCEDURE — 86140 C-REACTIVE PROTEIN: CPT | Performed by: SPECIALIST

## 2018-03-02 PROCEDURE — 87999 UNLISTED MICROBIOLOGY PX: CPT | Performed by: SPECIALIST

## 2018-03-02 PROCEDURE — 25010000002 POTASSIUM CHLORIDE PER 2 MEQ: Performed by: SPECIALIST

## 2018-03-02 PROCEDURE — 3E0336Z INTRODUCTION OF NUTRITIONAL SUBSTANCE INTO PERIPHERAL VEIN, PERCUTANEOUS APPROACH: ICD-10-PCS | Performed by: SPECIALIST

## 2018-03-02 PROCEDURE — 85027 COMPLETE CBC AUTOMATED: CPT | Performed by: SPECIALIST

## 2018-03-02 PROCEDURE — 84134 ASSAY OF PREALBUMIN: CPT | Performed by: SPECIALIST

## 2018-03-02 PROCEDURE — 84478 ASSAY OF TRIGLYCERIDES: CPT | Performed by: SPECIALIST

## 2018-03-02 PROCEDURE — 82330 ASSAY OF CALCIUM: CPT

## 2018-03-02 PROCEDURE — 82465 ASSAY BLD/SERUM CHOLESTEROL: CPT | Performed by: SPECIALIST

## 2018-03-02 PROCEDURE — 25010000002 ERTAPENEM PER 500 MG: Performed by: SPECIALIST

## 2018-03-02 RX ORDER — POTASSIUM CHLORIDE 14.9 MG/ML
20 INJECTION INTRAVENOUS
Status: COMPLETED | OUTPATIENT
Start: 2018-03-02 | End: 2018-03-02

## 2018-03-02 RX ADMIN — METRONIDAZOLE 500 MG: 500 INJECTION, SOLUTION INTRAVENOUS at 03:53

## 2018-03-02 RX ADMIN — SODIUM CHLORIDE, POTASSIUM CHLORIDE, SODIUM LACTATE AND CALCIUM CHLORIDE 125 ML/HR: 600; 310; 30; 20 INJECTION, SOLUTION INTRAVENOUS at 17:11

## 2018-03-02 RX ADMIN — SODIUM CHLORIDE 1 G: 900 INJECTION INTRAVENOUS at 20:41

## 2018-03-02 RX ADMIN — ASCORBIC ACID, VITAMIN A PALMITATE, CHOLECALCIFEROL, THIAMINE HYDROCHLORIDE, RIBOFLAVIN-5 PHOSPHATE SODIUM, PYRIDOXINE HYDROCHLORIDE, NIACINAMIDE, DEXPANTHENOL, ALPHA-TOCOPHEROL ACETATE, VITAMIN K1, FOLIC ACID, BIOTIN, CYANOCOBALAMIN: 200; 3300; 200; 6; 3.6; 6; 40; 15; 10; 150; 600; 60; 5 INJECTION, SOLUTION INTRAVENOUS at 17:05

## 2018-03-02 RX ADMIN — METRONIDAZOLE 500 MG: 500 INJECTION, SOLUTION INTRAVENOUS at 21:45

## 2018-03-02 RX ADMIN — METRONIDAZOLE 500 MG: 500 INJECTION, SOLUTION INTRAVENOUS at 08:47

## 2018-03-02 RX ADMIN — POTASSIUM CHLORIDE 20 MEQ: 200 INJECTION, SOLUTION INTRAVENOUS at 08:47

## 2018-03-02 RX ADMIN — SODIUM CHLORIDE, POTASSIUM CHLORIDE, SODIUM LACTATE AND CALCIUM CHLORIDE 125 ML/HR: 600; 310; 30; 20 INJECTION, SOLUTION INTRAVENOUS at 06:42

## 2018-03-02 RX ADMIN — FAMOTIDINE 20 MG: 10 INJECTION INTRAVENOUS at 08:47

## 2018-03-02 RX ADMIN — SODIUM CHLORIDE, POTASSIUM CHLORIDE, SODIUM LACTATE AND CALCIUM CHLORIDE 1000 ML: 600; 310; 30; 20 INJECTION, SOLUTION INTRAVENOUS at 08:47

## 2018-03-02 RX ADMIN — FAMOTIDINE 20 MG: 10 INJECTION INTRAVENOUS at 21:45

## 2018-03-02 RX ADMIN — SUCRALFATE 1 G: 1 SUSPENSION ORAL at 11:18

## 2018-03-02 RX ADMIN — POTASSIUM CHLORIDE 20 MEQ: 200 INJECTION, SOLUTION INTRAVENOUS at 10:19

## 2018-03-02 RX ADMIN — ONDANSETRON 4 MG: 2 INJECTION INTRAMUSCULAR; INTRAVENOUS at 05:32

## 2018-03-02 RX ADMIN — METRONIDAZOLE 500 MG: 500 INJECTION, SOLUTION INTRAVENOUS at 14:38

## 2018-03-02 RX ADMIN — SUCRALFATE 1 G: 1 SUSPENSION ORAL at 06:53

## 2018-03-02 RX ADMIN — I.V. FAT EMULSION 50 G: 20 EMULSION INTRAVENOUS at 17:06

## 2018-03-02 RX ADMIN — SUCRALFATE 1 G: 1 SUSPENSION ORAL at 17:14

## 2018-03-02 RX ADMIN — ENOXAPARIN SODIUM 40 MG: 100 INJECTION SUBCUTANEOUS at 05:32

## 2018-03-02 NOTE — PLAN OF CARE
Problem: Patient Care Overview (Adult)  Goal: Plan of Care Review  Outcome: Ongoing (interventions implemented as appropriate)   03/02/18 0250   Coping/Psychosocial Response Interventions   Plan Of Care Reviewed With patient   Patient Care Overview   Progress improving   Outcome Evaluation   Outcome Summary/Follow up Plan denies pain, denies N/V, tolerating CLD, will cont to monitor.      Goal: Adult Individualization and Mutuality  Outcome: Ongoing (interventions implemented as appropriate)   02/26/18 4446   Individualization   Patient Specific Preferences leave door closed   Patient Specific Goals reduce infection and pain   Patient Specific Interventions leave door closed   Mutuality/Individual Preferences   What Anxieties, Fears or Concerns Do You Have About Your Health or Care? none at present   What Questions Do You Have About Your Health or Care? none at present   What Information Would Help Us Give You More Personalized Care? none at present     Goal: Discharge Needs Assessment  Outcome: Ongoing (interventions implemented as appropriate)      Problem: Appendicitis/Appendectomy (Adult)  Goal: Signs and Symptoms of Listed Potential Problems Will be Absent or Manageable (Appendicitis/Appendectomy)  Outcome: Ongoing (interventions implemented as appropriate)

## 2018-03-02 NOTE — PLAN OF CARE
Problem: Patient Care Overview (Adult)  Goal: Plan of Care Review  Outcome: Ongoing (interventions implemented as appropriate)   03/02/18 0940   Coping/Psychosocial Response Interventions   Plan Of Care Reviewed With patient   Patient Care Overview   Progress no change   Outcome Evaluation   Outcome Summary/Follow up Plan Consult received to begin TPN via PICC line. Ordered D15% Aa5% to begin at 25mL/hour x 24 hours, then advance to goal rate of 62mL/hour 3/3. Pt to receive lipids daily. TPN at goal rate will provide 1557kcal and 74 grams of protein. This will meet est'd needs. Will cont to follow TPN administration and monitor labs.        Problem: Nutrition, Parenteral (Adult)  Goal: Signs and Symptoms of Listed Potential Problems Will be Absent or Manageable (Nutrition, Parenteral)  Outcome: Ongoing (interventions implemented as appropriate)

## 2018-03-02 NOTE — PLAN OF CARE
Problem: Patient Care Overview (Adult)  Goal: Plan of Care Review  Outcome: Ongoing (interventions implemented as appropriate)   03/01/18 2742   Coping/Psychosocial Response Interventions   Plan Of Care Reviewed With patient   Patient Care Overview   Progress no change   Outcome Evaluation   Outcome Summary/Follow up Plan C/o of pain. Pain meds given PRN. Voding per bathroom. Has been c/o of diarrhea today. Tolerating clears. Nutrition consulted. PICC line placed in L arm. Will cont to monitor.     Goal: Adult Individualization and Mutuality  Outcome: Ongoing (interventions implemented as appropriate)    Goal: Discharge Needs Assessment  Outcome: Ongoing (interventions implemented as appropriate)      Problem: Appendicitis/Appendectomy (Adult)  Goal: Signs and Symptoms of Listed Potential Problems Will be Absent or Manageable (Appendicitis/Appendectomy)  Outcome: Ongoing (interventions implemented as appropriate)

## 2018-03-02 NOTE — PROGRESS NOTES
Continued Stay Note   [facility]     Patient Name: Arnie Ross  MRN: [id number]  Today's Date: 3/2/2018    Admit Date: 2/26/2018          Discharge Plan       03/02/18 1431    Case Management/Social Work Plan    Plan HOME    Patient/Family In Agreement With Plan yes    Additional Comments REVIEWED CHART; NOTED TPN STARTED TODAY AND CONT. IV ABX.  WILL CONT. TO FOLLOW FOR ANY D/C PLANNING NEEDS THAT MAY ARISE.  PLEASE ADVISE.  THANKS.               Discharge Codes     None            GENTRY Bansal

## 2018-03-02 NOTE — PLAN OF CARE
Problem: Patient Care Overview (Adult)  Goal: Plan of Care Review  Outcome: Ongoing (interventions implemented as appropriate)  Pt currently resting with eyes closed. Pt states she has been having frequent liquid stools. Stools are loose light brown. Gastric panel was sent to lab, results negative. Pt c/o she feels dizzy when standing up. RN encouraged patient to call for assistance when getting up. 1 L bolus given. Continue IVF. Plan to start TPN and Lipids this evening per MD order. Pt denies pain unless bending over. No reports of nausea or vomiting this shift. Pt is tolerating sipping on clear liquids.    03/02/18 1242   Coping/Psychosocial Response Interventions   Plan Of Care Reviewed With patient   Patient Care Overview   Progress improving     Goal: Adult Individualization and Mutuality  Outcome: Ongoing (interventions implemented as appropriate)   03/02/18 1242   Individualization   Patient Specific Goals Infection resolved. Diarrhea resolved. Tolerating diet.   Patient Specific Interventions IVF. IV antibiotics. Stool culture sent .     Goal: Discharge Needs Assessment  Outcome: Ongoing (interventions implemented as appropriate)      Problem: Appendicitis/Appendectomy (Adult)  Goal: Signs and Symptoms of Listed Potential Problems Will be Absent or Manageable (Appendicitis/Appendectomy)  Outcome: Ongoing (interventions implemented as appropriate)   03/02/18 1242   Appendicitis/Appendectomy   Problems Assessed (Appendicitis/Appendectomy) all   Problems Present (Appendicitis/Appendectomy) pain;infection       Problem: Nutrition, Parenteral (Adult)  Goal: Signs and Symptoms of Listed Potential Problems Will be Absent or Manageable (Nutrition, Parenteral)  Outcome: Ongoing (interventions implemented as appropriate)   03/02/18 1242   Nutrition, Parenteral   Problems Assessed (Parenteral Nutrition) all   Problems Present (Parenteral Nutrition) electrolyte imbalance;malnutrition/nutritional  deficiencies;situational response       Problem: Diarrhea (Adult)  Goal: Identify Related Risk Factors and Signs and Symptoms  Outcome: Ongoing (interventions implemented as appropriate)   03/02/18 1242   Diarrhea   Diarrhea: Related Risk Factors antibiotics;infectious process   Signs and Symptoms (Diarrhea) nausea and vomiting;loose, watery stool;malaise;decreased appetite     Goal: Improved/Reduced Symptoms  Outcome: Ongoing (interventions implemented as appropriate)   03/02/18 1242   Diarrhea (Adult)   Improved/Reduced Symptoms making progress toward outcome

## 2018-03-02 NOTE — PROGRESS NOTES
Jackie Grewal MD FACS  Progress Note     LOS: 4 days   Patient Care Team:  Nando Pope MD as PCP - General (Family Medicine)      Subjective     Interval History:      loose stools.  Pain greatly improved.  Lightheaded.     Objective     Vital Signs  Temp:  [97.7 °F (36.5 °C)-98.7 °F (37.1 °C)] 97.7 °F (36.5 °C)  Heart Rate:  [64-83] 64  Resp:  [16-18] 16  BP: (100-144)/(52-70) 115/52    Physical Exam:  General appearance - alert, well appearing, and in no distress  Abdomen - soft, decreased distension, greatly decreased RLQ pain      Results Review:    Lab Results (last 24 hours)     Procedure Component Value Units Date/Time    CBC (No Diff) [[id number]]  (Abnormal) Collected:  03/02/18 0704    Specimen:  Blood Updated:  03/02/18 0717     WBC 12.48 (H) 10*3/mm3      RBC 2.76 (L) 10*6/mm3      Hemoglobin 8.7 (L) g/dL      Hematocrit 25.5 (L) %      MCV 92.4 fL      MCH 31.5 pg      MCHC 34.1 g/dL      RDW 12.5 %      RDW-SD 42.0 fl      MPV 10.0 fL      Platelets 387 10*3/mm3     Basic Metabolic Panel [[id number]]  (Abnormal) Collected:  03/02/18 0704    Specimen:  Blood Updated:  03/02/18 0729     Glucose 103 (H) mg/dL      BUN 19 mg/dL      Creatinine 0.72 mg/dL      Sodium 143 mmol/L      Potassium 3.4 (L) mmol/L      Chloride 105 mmol/L      CO2 29.0 mmol/L      Calcium 8.6 mg/dL      eGFR  African Amer 97 mL/min/1.73      BUN/Creatinine Ratio 26.4 (H)     Anion Gap 9.0 mmol/L     Narrative:       The MDRD GFR formula is only valid for adults with stable renal function between ages 18 and 70.    Magnesium [[id number]]  (Normal) Collected:  03/02/18 0704    Specimen:  Blood Updated:  03/02/18 1015     Magnesium 1.9 mg/dL     Phosphorus [[id number]]  (Normal) Collected:  03/02/18 0704    Specimen:  Blood Updated:  03/02/18 1015     Phosphorus 2.9 mg/dL     Triglycerides [[id number]]  (Normal) Collected:  03/02/18 0704    Specimen:  Blood Updated:  03/02/18 1015     Triglycerides 82 mg/dL     Comprehensive  Metabolic Panel [[id number]]  (Abnormal) Collected:  03/02/18 0704    Specimen:  Blood Updated:  03/02/18 1015     Glucose 103 (H) mg/dL      BUN 19 mg/dL      Creatinine 0.72 mg/dL      Sodium 143 mmol/L      Potassium 3.4 (L) mmol/L      Chloride 105 mmol/L      CO2 29.0 mmol/L      Calcium 8.6 mg/dL      Total Protein 5.9 (L) g/dL      Albumin 2.60 (L) g/dL      ALT (SGPT) 21 U/L      AST (SGOT) 36 U/L      Alkaline Phosphatase 66 U/L      Total Bilirubin 0.5 mg/dL      eGFR  African Amer 97 mL/min/1.73      Globulin 3.3 gm/dL      A/G Ratio 0.8 (L) g/dL      BUN/Creatinine Ratio 26.4 (H)     Anion Gap 9.0 mmol/L     Narrative:       The MDRD GFR formula is only valid for adults with stable renal function between ages 18 and 70.    Cholesterol, Total [[id number]]  (Abnormal) Collected:  03/02/18 0704    Specimen:  Blood Updated:  03/02/18 1015     Total Cholesterol 72 (L) mg/dL     C-reactive Protein [[id number]]  (Abnormal) Collected:  03/02/18 0704    Specimen:  Blood Updated:  03/02/18 1028     C-Reactive Protein 17.31 (H) mg/dL     Gastrointestinal Panel, PCR - Stool, Per Rectum [[id number]] Collected:  03/02/18 1021    Specimen:  Stool from Per Rectum Updated:  03/02/18 1030    Prealbumin [[id number]]  (Abnormal) Collected:  03/02/18 0959    Specimen:  Blood Updated:  03/02/18 1122     Prealbumin 3.9 (L) mg/dL         Imaging Results (last 24 hours)     ** No results found for the last 24 hours. **            Assessment/Plan       Perforated appendicitis with inflammatory phlegmon.  TPN will start today.  Clears as tolerated.  Continue iv abx.  Replete SAUL Grewal MD  03/02/18  11:49 AM

## 2018-03-03 LAB
ALBUMIN SERPL-MCNC: 2.6 G/DL (ref 3.5–5)
ALBUMIN/GLOB SERPL: 0.8 G/DL (ref 1.1–2.5)
ALP SERPL-CCNC: 60 U/L (ref 24–120)
ALT SERPL W P-5'-P-CCNC: 27 U/L (ref 0–54)
ANION GAP SERPL CALCULATED.3IONS-SCNC: 7 MMOL/L (ref 4–13)
AST SERPL-CCNC: 40 U/L (ref 7–45)
BILIRUB SERPL-MCNC: 0.3 MG/DL (ref 0.1–1)
BUN BLD-MCNC: 11 MG/DL (ref 5–21)
BUN/CREAT SERPL: 16.4 (ref 7–25)
CA-I BLD-MCNC: 4.85 MG/DL (ref 4.6–5.4)
CALCIUM SPEC-SCNC: 8.7 MG/DL (ref 8.4–10.4)
CHLORIDE SERPL-SCNC: 106 MMOL/L (ref 98–110)
CO2 SERPL-SCNC: 29 MMOL/L (ref 24–31)
CREAT BLD-MCNC: 0.67 MG/DL (ref 0.5–1.4)
DEPRECATED RDW RBC AUTO: 43.4 FL (ref 40–54)
ERYTHROCYTE [DISTWIDTH] IN BLOOD BY AUTOMATED COUNT: 12.6 % (ref 12–15)
GFR SERPL CREATININE-BSD FRML MDRD: 105 ML/MIN/1.73
GLOBULIN UR ELPH-MCNC: 3.3 GM/DL
GLUCOSE BLD-MCNC: 130 MG/DL (ref 70–100)
GLUCOSE BLDC GLUCOMTR-MCNC: 117 MG/DL (ref 70–130)
GLUCOSE BLDC GLUCOMTR-MCNC: 122 MG/DL (ref 70–130)
GLUCOSE BLDC GLUCOMTR-MCNC: 144 MG/DL (ref 70–130)
GLUCOSE BLDC GLUCOMTR-MCNC: 147 MG/DL (ref 70–130)
HCT VFR BLD AUTO: 25.5 % (ref 37–47)
HGB BLD-MCNC: 8.6 G/DL (ref 12–16)
Lab: NORMAL
MAGNESIUM SERPL-MCNC: 1.8 MG/DL (ref 1.4–2.2)
MCH RBC QN AUTO: 32.1 PG (ref 28–32)
MCHC RBC AUTO-ENTMCNC: 33.7 G/DL (ref 33–36)
MCV RBC AUTO: 95.1 FL (ref 82–98)
PHOSPHATE SERPL-MCNC: 3.1 MG/DL (ref 2.5–4.5)
PLATELET # BLD AUTO: 375 10*3/MM3 (ref 130–400)
PMV BLD AUTO: 10.3 FL (ref 6–12)
POTASSIUM BLD-SCNC: 3.2 MMOL/L (ref 3.5–5.3)
PROT SERPL-MCNC: 5.9 G/DL (ref 6.3–8.7)
RBC # BLD AUTO: 2.68 10*6/MM3 (ref 4.2–5.4)
SODIUM BLD-SCNC: 142 MMOL/L (ref 135–145)
WBC NRBC COR # BLD: 9.84 10*3/MM3 (ref 4.8–10.8)

## 2018-03-03 PROCEDURE — 82962 GLUCOSE BLOOD TEST: CPT

## 2018-03-03 PROCEDURE — 85027 COMPLETE CBC AUTOMATED: CPT | Performed by: SPECIALIST

## 2018-03-03 PROCEDURE — 83735 ASSAY OF MAGNESIUM: CPT | Performed by: SPECIALIST

## 2018-03-03 PROCEDURE — 25010000002 ERTAPENEM PER 500 MG: Performed by: SPECIALIST

## 2018-03-03 PROCEDURE — 25010000002 ENOXAPARIN PER 10 MG: Performed by: SPECIALIST

## 2018-03-03 PROCEDURE — 36415 COLL VENOUS BLD VENIPUNCTURE: CPT | Performed by: SPECIALIST

## 2018-03-03 PROCEDURE — 25010000002 FUROSEMIDE PER 20 MG: Performed by: SPECIALIST

## 2018-03-03 PROCEDURE — 84100 ASSAY OF PHOSPHORUS: CPT | Performed by: SPECIALIST

## 2018-03-03 PROCEDURE — 25010000002 POTASSIUM CHLORIDE PER 2 MEQ: Performed by: SPECIALIST

## 2018-03-03 PROCEDURE — 80053 COMPREHEN METABOLIC PANEL: CPT | Performed by: SPECIALIST

## 2018-03-03 PROCEDURE — 82330 ASSAY OF CALCIUM: CPT

## 2018-03-03 RX ORDER — FUROSEMIDE 10 MG/ML
20 INJECTION INTRAMUSCULAR; INTRAVENOUS ONCE
Status: COMPLETED | OUTPATIENT
Start: 2018-03-03 | End: 2018-03-03

## 2018-03-03 RX ORDER — POTASSIUM CHLORIDE 14.9 MG/ML
20 INJECTION INTRAVENOUS
Status: COMPLETED | OUTPATIENT
Start: 2018-03-03 | End: 2018-03-03

## 2018-03-03 RX ORDER — SUCRALFATE ORAL 1 G/10ML
1 SUSPENSION ORAL EVERY 6 HOURS SCHEDULED
Status: DISCONTINUED | OUTPATIENT
Start: 2018-03-03 | End: 2018-03-03 | Stop reason: SDUPTHER

## 2018-03-03 RX ADMIN — ENOXAPARIN SODIUM 40 MG: 100 INJECTION SUBCUTANEOUS at 06:02

## 2018-03-03 RX ADMIN — SUCRALFATE 1 G: 1 SUSPENSION ORAL at 17:05

## 2018-03-03 RX ADMIN — FAMOTIDINE 20 MG: 10 INJECTION INTRAVENOUS at 20:29

## 2018-03-03 RX ADMIN — SODIUM CHLORIDE 1 G: 900 INJECTION INTRAVENOUS at 20:29

## 2018-03-03 RX ADMIN — SUCRALFATE 1 G: 1 SUSPENSION ORAL at 23:05

## 2018-03-03 RX ADMIN — METRONIDAZOLE 500 MG: 500 INJECTION, SOLUTION INTRAVENOUS at 22:17

## 2018-03-03 RX ADMIN — POTASSIUM CHLORIDE 20 MEQ: 200 INJECTION, SOLUTION INTRAVENOUS at 12:56

## 2018-03-03 RX ADMIN — FUROSEMIDE 20 MG: 10 INJECTION, SOLUTION INTRAVENOUS at 18:01

## 2018-03-03 RX ADMIN — SUCRALFATE 1 G: 1 SUSPENSION ORAL at 06:02

## 2018-03-03 RX ADMIN — SUCRALFATE 1 G: 1 SUSPENSION ORAL at 11:55

## 2018-03-03 RX ADMIN — SODIUM CHLORIDE, POTASSIUM CHLORIDE, SODIUM LACTATE AND CALCIUM CHLORIDE 50 ML/HR: 600; 310; 30; 20 INJECTION, SOLUTION INTRAVENOUS at 20:26

## 2018-03-03 RX ADMIN — POTASSIUM CHLORIDE 20 MEQ: 200 INJECTION, SOLUTION INTRAVENOUS at 15:57

## 2018-03-03 RX ADMIN — SUCRALFATE 1 G: 1 SUSPENSION ORAL at 00:01

## 2018-03-03 RX ADMIN — I.V. FAT EMULSION 50 G: 20 EMULSION INTRAVENOUS at 17:06

## 2018-03-03 RX ADMIN — METRONIDAZOLE 500 MG: 500 INJECTION, SOLUTION INTRAVENOUS at 08:55

## 2018-03-03 RX ADMIN — ASCORBIC ACID, VITAMIN A PALMITATE, CHOLECALCIFEROL, THIAMINE HYDROCHLORIDE, RIBOFLAVIN-5 PHOSPHATE SODIUM, PYRIDOXINE HYDROCHLORIDE, NIACINAMIDE, DEXPANTHENOL, ALPHA-TOCOPHEROL ACETATE, VITAMIN K1, FOLIC ACID, BIOTIN, CYANOCOBALAMIN: 200; 3300; 200; 6; 3.6; 6; 40; 15; 10; 150; 600; 60; 5 INJECTION, SOLUTION INTRAVENOUS at 17:06

## 2018-03-03 RX ADMIN — POTASSIUM CHLORIDE 20 MEQ: 200 INJECTION, SOLUTION INTRAVENOUS at 11:55

## 2018-03-03 RX ADMIN — POTASSIUM CHLORIDE 20 MEQ: 200 INJECTION, SOLUTION INTRAVENOUS at 13:59

## 2018-03-03 RX ADMIN — METRONIDAZOLE 500 MG: 500 INJECTION, SOLUTION INTRAVENOUS at 17:05

## 2018-03-03 RX ADMIN — SODIUM CHLORIDE, POTASSIUM CHLORIDE, SODIUM LACTATE AND CALCIUM CHLORIDE 125 ML/HR: 600; 310; 30; 20 INJECTION, SOLUTION INTRAVENOUS at 03:13

## 2018-03-03 RX ADMIN — METRONIDAZOLE 500 MG: 500 INJECTION, SOLUTION INTRAVENOUS at 03:13

## 2018-03-03 RX ADMIN — FAMOTIDINE 20 MG: 10 INJECTION INTRAVENOUS at 08:55

## 2018-03-03 RX ADMIN — FUROSEMIDE 20 MG: 10 INJECTION, SOLUTION INTRAVENOUS at 11:56

## 2018-03-03 RX ADMIN — POTASSIUM CHLORIDE 20 MEQ: 200 INJECTION, SOLUTION INTRAVENOUS at 14:57

## 2018-03-03 NOTE — PROGRESS NOTES
LOS: 5 days   Patient Care Team:  Nando Pope MD as PCP - General (Family Medicine)    Chief Complaint:  Right lower quadrant pain    Subjective     Subjective     Hospital day #5 status post admission for right lower quadrant pain that was noted to be a perforated appendicitis.  She is passing some gas also some loose stools she still complains of being distended she is also had some wheezing noted, no fever or chills, white count is reduced to 9, electrolytes within normal limits except for potassium of 3.2.  Objective      Objective     Vital Signs  Temp:  [97.7 °F (36.5 °C)-98.8 °F (37.1 °C)] 98.2 °F (36.8 °C)  Heart Rate:  [60-72] 62  Resp:  [16] 16  BP: (114-135)/(52-79) 115/66    Intake & Output (last 3 days)       02/28 0701 - 03/01 0700 03/01 0701 - 03/02 0700 03/02 0701 - 03/03 0700 03/03 0701 - 03/04 0700    P.O.  240 240     I.V. (mL/kg) 900 (12) 6672 (88.6) 2169 (28.8) 589 (7.8)    IV Piggyback   100     TPN    252    Total Intake(mL/kg) 900 (12) 6912 (91.8) 2509 (33.3) 841 (11.2)    Urine (mL/kg/hr) 400 (0.2)       Stool 0 (0)  1 (0)     Total Output 400   1      Net +500 +6912 +2508 +841            Unmeasured Urine Occurrence  8 x 8 x     Unmeasured Stool Occurrence 1 x 1 x 2 x           Physical Exam:     General Appearance:    Alert, cooperative, in no acute distress   Lungs:    Equal breath sounds, occasional rales in the bases bilaterally     Heart:    Regular rhythm and normal rate, normal S1 and S2, no            murmur, no gallop, no rub, no click   Chest Wall:    No abnormalities observed   Abdomen:    Complications of bowel sounds, no masses, tender in the right lower quadrant but improved,         Results Review:     I reviewed the patient's new clinical results.  I reviewed the patient's new imaging results and agree with the interpretation.      Results from last 7 days  Lab Units 03/03/18  0459 03/02/18  0704 03/01/18  0421   WBC 10*3/mm3 9.84 12.48* 12.91*   HEMOGLOBIN g/dL 8.6*  8.7* 8.8*   HEMATOCRIT % 25.5* 25.5* 26.3*   PLATELETS 10*3/mm3 375 387 386          Results from last 7 days  Lab Units 03/03/18  0459 03/02/18  0704 03/01/18  0421  02/26/18  1859   SODIUM mmol/L 142 143  143 143  < > 139   POTASSIUM mmol/L 3.2* 3.4*  3.4* 3.6  < > 3.9   CHLORIDE mmol/L 106 105  105 104  < > 99   CO2 mmol/L 29.0 29.0  29.0 24.0  < > 29.0   BUN mg/dL 11 19  19 16  < > 13   CREATININE mg/dL 0.67 0.72  0.72 0.66  < > 0.99   CALCIUM mg/dL 8.7 8.6  8.6 8.9  < > 9.8   BILIRUBIN mg/dL 0.3 0.5  --   --  1.0   ALK PHOS U/L 60 66  --   --  104   ALT (SGPT) U/L 27 21  --   --  16   AST (SGOT) U/L 40 36  --   --  41   GLUCOSE mg/dL 130* 103*  103* 88  < > 109*   < > = values in this interval not displayed.    Assessment/Plan     Assessment/Plan     Active Problems:    Appendicitis with perforation      Currently plan to trim the total IV rate to 100 mL an hour, replace potassium with 100 mEq of KCl, Lasix 20 mg IV this morning and also in afternoon, check labs in the morning, diurese her as she is mobilizing her fluid, increase her to full liquids today and potentially wean the TPN on Monday or Tuesday.      Tunde Pink MD  03/03/18  10:24 AM      Time: time spent with patient 15 minutes     EMR Dragon/Transcription disclaimer: Much of this encounter note is an electronic transcription/translation of spoken language to printed text. The electronic translation of spoken language may permit erroneous, or at times, nonsensical words or phrases to be inadvertently transcribed; although I have reviewed the note for such errors, some may still exist.

## 2018-03-03 NOTE — PLAN OF CARE
Problem: Patient Care Overview (Adult)  Goal: Plan of Care Review  Outcome: Ongoing (interventions implemented as appropriate)   03/03/18 0131   Coping/Psychosocial Response Interventions   Plan Of Care Reviewed With patient;spouse   Patient Care Overview   Progress improving   Outcome Evaluation   Outcome Summary/Follow up Plan patient denies pain, no c/o N/V, TPN and Lipids infusing, Q6 accu checks, will cont to monitor.      Goal: Adult Individualization and Mutuality  Outcome: Ongoing (interventions implemented as appropriate)   02/26/18 2155 03/02/18 1242   Individualization   Patient Specific Preferences leave door closed --    Patient Specific Goals --  Infection resolved. Diarrhea resolved. Tolerating diet.   Patient Specific Interventions --  IVF. IV antibiotics. Stool culture sent .   Mutuality/Individual Preferences   What Anxieties, Fears or Concerns Do You Have About Your Health or Care? none at present --    What Questions Do You Have About Your Health or Care? none at present --    What Information Would Help Us Give You More Personalized Care? none at present --      Goal: Discharge Needs Assessment  Outcome: Ongoing (interventions implemented as appropriate)      Problem: Appendicitis/Appendectomy (Adult)  Goal: Signs and Symptoms of Listed Potential Problems Will be Absent or Manageable (Appendicitis/Appendectomy)  Outcome: Ongoing (interventions implemented as appropriate)   03/02/18 1242   Appendicitis/Appendectomy   Problems Assessed (Appendicitis/Appendectomy) all   Problems Present (Appendicitis/Appendectomy) pain;infection       Problem: Nutrition, Parenteral (Adult)  Goal: Signs and Symptoms of Listed Potential Problems Will be Absent or Manageable (Nutrition, Parenteral)  Outcome: Ongoing (interventions implemented as appropriate)   03/02/18 1242   Nutrition, Parenteral   Problems Assessed (Parenteral Nutrition) all   Problems Present (Parenteral Nutrition) electrolyte  imbalance;malnutrition/nutritional deficiencies;situational response       Problem: Diarrhea (Adult)  Goal: Identify Related Risk Factors and Signs and Symptoms  Outcome: Ongoing (interventions implemented as appropriate)   03/02/18 1242   Diarrhea   Diarrhea: Related Risk Factors antibiotics;infectious process   Signs and Symptoms (Diarrhea) nausea and vomiting;loose, watery stool;malaise;decreased appetite     Goal: Improved/Reduced Symptoms  Outcome: Ongoing (interventions implemented as appropriate)   03/03/18 0131   Diarrhea (Adult)   Improved/Reduced Symptoms making progress toward outcome

## 2018-03-03 NOTE — PLAN OF CARE
Problem: Patient Care Overview (Adult)  Goal: Plan of Care Review  Outcome: Ongoing (interventions implemented as appropriate)   03/03/18 1411   Coping/Psychosocial Response Interventions   Plan Of Care Reviewed With patient   Patient Care Overview   Progress improving   Pt rates pain 2/10. IV abx continue. TPN and lipids. Up ad rosa isela. Accuchecks every 6 hours.   Goal: Adult Individualization and Mutuality  Outcome: Ongoing (interventions implemented as appropriate)    Goal: Discharge Needs Assessment  Outcome: Ongoing (interventions implemented as appropriate)      Problem: Appendicitis/Appendectomy (Adult)  Goal: Signs and Symptoms of Listed Potential Problems Will be Absent or Manageable (Appendicitis/Appendectomy)  Outcome: Ongoing (interventions implemented as appropriate)   03/03/18 1411   Appendicitis/Appendectomy   Problems Assessed (Appendicitis/Appendectomy) all   Problems Present (Appendicitis/Appendectomy) pain;infection       Problem: Nutrition, Parenteral (Adult)  Goal: Signs and Symptoms of Listed Potential Problems Will be Absent or Manageable (Nutrition, Parenteral)  Outcome: Ongoing (interventions implemented as appropriate)   03/03/18 1411   Nutrition, Parenteral   Problems Assessed (Parenteral Nutrition) all   Problems Present (Parenteral Nutrition) electrolyte imbalance;malnutrition/nutritional deficiencies       Problem: Diarrhea (Adult)  Goal: Identify Related Risk Factors and Signs and Symptoms  Outcome: Ongoing (interventions implemented as appropriate)   03/03/18 1411   Diarrhea   Diarrhea: Related Risk Factors antibiotics;infectious process   Signs and Symptoms (Diarrhea) nausea and vomiting;loose, watery stool;decreased appetite     Goal: Improved/Reduced Symptoms  Outcome: Ongoing (interventions implemented as appropriate)   03/03/18 1411   Diarrhea (Adult)   Improved/Reduced Symptoms making progress toward outcome

## 2018-03-04 LAB
ALBUMIN SERPL-MCNC: 2.6 G/DL (ref 3.5–5)
ALBUMIN/GLOB SERPL: 0.8 G/DL (ref 1.1–2.5)
ALP SERPL-CCNC: 56 U/L (ref 24–120)
ALT SERPL W P-5'-P-CCNC: 30 U/L (ref 0–54)
ANION GAP SERPL CALCULATED.3IONS-SCNC: 8 MMOL/L (ref 4–13)
AST SERPL-CCNC: 33 U/L (ref 7–45)
BILIRUB SERPL-MCNC: 0.2 MG/DL (ref 0.1–1)
BUN BLD-MCNC: 10 MG/DL (ref 5–21)
BUN/CREAT SERPL: 13.5 (ref 7–25)
CA-I BLD-MCNC: 4.8 MG/DL (ref 4.6–5.4)
CALCIUM SPEC-SCNC: 8.9 MG/DL (ref 8.4–10.4)
CHLORIDE SERPL-SCNC: 104 MMOL/L (ref 98–110)
CO2 SERPL-SCNC: 30 MMOL/L (ref 24–31)
CREAT BLD-MCNC: 0.74 MG/DL (ref 0.5–1.4)
DEPRECATED RDW RBC AUTO: 44.3 FL (ref 40–54)
ERYTHROCYTE [DISTWIDTH] IN BLOOD BY AUTOMATED COUNT: 12.8 % (ref 12–15)
ERYTHROCYTE [SEDIMENTATION RATE] IN BLOOD: 49 MM/HR (ref 0–20)
GFR SERPL CREATININE-BSD FRML MDRD: 94 ML/MIN/1.73
GLOBULIN UR ELPH-MCNC: 3.3 GM/DL
GLUCOSE BLD-MCNC: 144 MG/DL (ref 70–100)
GLUCOSE BLDC GLUCOMTR-MCNC: 117 MG/DL (ref 70–130)
GLUCOSE BLDC GLUCOMTR-MCNC: 132 MG/DL (ref 70–130)
GLUCOSE BLDC GLUCOMTR-MCNC: 141 MG/DL (ref 70–130)
HCT VFR BLD AUTO: 26.5 % (ref 37–47)
HGB BLD-MCNC: 8.9 G/DL (ref 12–16)
Lab: NORMAL
MAGNESIUM SERPL-MCNC: 1.7 MG/DL (ref 1.4–2.2)
MCH RBC QN AUTO: 32.1 PG (ref 28–32)
MCHC RBC AUTO-ENTMCNC: 33.6 G/DL (ref 33–36)
MCV RBC AUTO: 95.7 FL (ref 82–98)
PHOSPHATE SERPL-MCNC: 3.9 MG/DL (ref 2.5–4.5)
PLATELET # BLD AUTO: 378 10*3/MM3 (ref 130–400)
PMV BLD AUTO: 9.9 FL (ref 6–12)
POTASSIUM BLD-SCNC: 3.6 MMOL/L (ref 3.5–5.3)
PROT SERPL-MCNC: 5.9 G/DL (ref 6.3–8.7)
RBC # BLD AUTO: 2.77 10*6/MM3 (ref 4.2–5.4)
SODIUM BLD-SCNC: 142 MMOL/L (ref 135–145)
WBC NRBC COR # BLD: 8.39 10*3/MM3 (ref 4.8–10.8)

## 2018-03-04 PROCEDURE — 85651 RBC SED RATE NONAUTOMATED: CPT | Performed by: SPECIALIST

## 2018-03-04 PROCEDURE — 25010000002 ENOXAPARIN PER 10 MG: Performed by: SPECIALIST

## 2018-03-04 PROCEDURE — 84100 ASSAY OF PHOSPHORUS: CPT | Performed by: SPECIALIST

## 2018-03-04 PROCEDURE — 83735 ASSAY OF MAGNESIUM: CPT | Performed by: SPECIALIST

## 2018-03-04 PROCEDURE — 82962 GLUCOSE BLOOD TEST: CPT

## 2018-03-04 PROCEDURE — 80053 COMPREHEN METABOLIC PANEL: CPT | Performed by: SPECIALIST

## 2018-03-04 PROCEDURE — 82330 ASSAY OF CALCIUM: CPT

## 2018-03-04 PROCEDURE — 36415 COLL VENOUS BLD VENIPUNCTURE: CPT | Performed by: SPECIALIST

## 2018-03-04 PROCEDURE — 25010000002 FUROSEMIDE PER 20 MG: Performed by: SPECIALIST

## 2018-03-04 PROCEDURE — 85027 COMPLETE CBC AUTOMATED: CPT | Performed by: SPECIALIST

## 2018-03-04 PROCEDURE — 25010000002 ERTAPENEM PER 500 MG: Performed by: SPECIALIST

## 2018-03-04 RX ORDER — FUROSEMIDE 10 MG/ML
20 INJECTION INTRAMUSCULAR; INTRAVENOUS ONCE
Status: COMPLETED | OUTPATIENT
Start: 2018-03-04 | End: 2018-03-04

## 2018-03-04 RX ADMIN — SUCRALFATE 1 G: 1 SUSPENSION ORAL at 05:34

## 2018-03-04 RX ADMIN — SODIUM CHLORIDE, POTASSIUM CHLORIDE, SODIUM LACTATE AND CALCIUM CHLORIDE 50 ML/HR: 600; 310; 30; 20 INJECTION, SOLUTION INTRAVENOUS at 21:44

## 2018-03-04 RX ADMIN — METRONIDAZOLE 500 MG: 500 INJECTION, SOLUTION INTRAVENOUS at 15:59

## 2018-03-04 RX ADMIN — ENOXAPARIN SODIUM 40 MG: 100 INJECTION SUBCUTANEOUS at 05:34

## 2018-03-04 RX ADMIN — METRONIDAZOLE 500 MG: 500 INJECTION, SOLUTION INTRAVENOUS at 08:58

## 2018-03-04 RX ADMIN — METRONIDAZOLE 500 MG: 500 INJECTION, SOLUTION INTRAVENOUS at 03:06

## 2018-03-04 RX ADMIN — SUCRALFATE 1 G: 1 SUSPENSION ORAL at 11:38

## 2018-03-04 RX ADMIN — ASCORBIC ACID, VITAMIN A PALMITATE, CHOLECALCIFEROL, THIAMINE HYDROCHLORIDE, RIBOFLAVIN-5 PHOSPHATE SODIUM, PYRIDOXINE HYDROCHLORIDE, NIACINAMIDE, DEXPANTHENOL, ALPHA-TOCOPHEROL ACETATE, VITAMIN K1, FOLIC ACID, BIOTIN, CYANOCOBALAMIN: 200; 3300; 200; 6; 3.6; 6; 40; 15; 10; 150; 600; 60; 5 INJECTION, SOLUTION INTRAVENOUS at 17:49

## 2018-03-04 RX ADMIN — SUCRALFATE 1 G: 1 SUSPENSION ORAL at 17:49

## 2018-03-04 RX ADMIN — I.V. FAT EMULSION 50 G: 20 EMULSION INTRAVENOUS at 17:49

## 2018-03-04 RX ADMIN — FAMOTIDINE 20 MG: 10 INJECTION INTRAVENOUS at 20:33

## 2018-03-04 RX ADMIN — FUROSEMIDE 20 MG: 10 INJECTION, SOLUTION INTRAVENOUS at 08:57

## 2018-03-04 RX ADMIN — SODIUM CHLORIDE 1 G: 900 INJECTION INTRAVENOUS at 20:32

## 2018-03-04 RX ADMIN — FAMOTIDINE 20 MG: 10 INJECTION INTRAVENOUS at 08:57

## 2018-03-04 RX ADMIN — METRONIDAZOLE 500 MG: 500 INJECTION, SOLUTION INTRAVENOUS at 22:05

## 2018-03-04 NOTE — PLAN OF CARE
Problem: Patient Care Overview (Adult)  Goal: Plan of Care Review  Outcome: Ongoing (interventions implemented as appropriate)   03/04/18 0148   Coping/Psychosocial Response Interventions   Plan Of Care Reviewed With patient   Patient Care Overview   Progress no change   Outcome Evaluation   Outcome Summary/Follow up Plan Pt denies pain or n/v. TPN and Lipids infusing. Acuchecks q 6. 147 at 0000. Voiding. Continue to monitor       Problem: Appendicitis/Appendectomy (Adult)  Goal: Signs and Symptoms of Listed Potential Problems Will be Absent or Manageable (Appendicitis/Appendectomy)  Outcome: Ongoing (interventions implemented as appropriate)      Problem: Nutrition, Parenteral (Adult)  Goal: Signs and Symptoms of Listed Potential Problems Will be Absent or Manageable (Nutrition, Parenteral)  Outcome: Ongoing (interventions implemented as appropriate)      Problem: Diarrhea (Adult)  Goal: Identify Related Risk Factors and Signs and Symptoms  Outcome: Ongoing (interventions implemented as appropriate)    Goal: Improved/Reduced Symptoms  Outcome: Ongoing (interventions implemented as appropriate)

## 2018-03-04 NOTE — PLAN OF CARE
Problem: Patient Care Overview (Adult)  Goal: Plan of Care Review  Outcome: Ongoing (interventions implemented as appropriate)   03/04/18 1510   Coping/Psychosocial Response Interventions   Plan Of Care Reviewed With patient   Patient Care Overview   Progress no change   Pt denies pain, nausea or vomitting. TPN continues. IV abx continues.   Goal: Adult Individualization and Mutuality  Outcome: Ongoing (interventions implemented as appropriate)    Goal: Discharge Needs Assessment  Outcome: Ongoing (interventions implemented as appropriate)      Problem: Appendicitis/Appendectomy (Adult)  Goal: Signs and Symptoms of Listed Potential Problems Will be Absent or Manageable (Appendicitis/Appendectomy)  Outcome: Ongoing (interventions implemented as appropriate)   03/04/18 1510   Appendicitis/Appendectomy   Problems Assessed (Appendicitis/Appendectomy) all   Problems Present (Appendicitis/Appendectomy) infection       Problem: Nutrition, Parenteral (Adult)  Goal: Signs and Symptoms of Listed Potential Problems Will be Absent or Manageable (Nutrition, Parenteral)  Outcome: Ongoing (interventions implemented as appropriate)   03/04/18 1510   Nutrition, Parenteral   Problems Assessed (Parenteral Nutrition) all   Problems Present (Parenteral Nutrition) malnutrition/nutritional deficiencies       Problem: Diarrhea (Adult)  Goal: Identify Related Risk Factors and Signs and Symptoms  Outcome: Ongoing (interventions implemented as appropriate)   03/04/18 1510   Diarrhea   Diarrhea: Related Risk Factors antibiotics;infectious process   Signs and Symptoms (Diarrhea) loose, watery stool     Goal: Improved/Reduced Symptoms  Outcome: Ongoing (interventions implemented as appropriate)   03/04/18 1510   Diarrhea (Adult)   Improved/Reduced Symptoms making progress toward outcome

## 2018-03-04 NOTE — PROGRESS NOTES
LOS: 6 days   Patient Care Team:  Nando Pope MD as PCP - General (Family Medicine)    Chief Complaint: Perforated appendix    Subjective     Subjective     Respiratory: #6 status post admission for right lower quadrant pain noted have a perforated appendix she is passing gas also bowel movements, she is much less distended she uses a CPAP at home and wants to use her CPAP and now with normalization of her bowel function IV no problems with her using the CPAP, her potassium was replaced yesterday it is currently up to 3.6, she had an excellent diuresis yesterday and is breathing better without wheezing.  Objective      Objective     Vital Signs  Temp:  [97.5 °F (36.4 °C)-98.4 °F (36.9 °C)] 98.4 °F (36.9 °C)  Heart Rate:  [60-73] 60  Resp:  [16-18] 16  BP: ()/(55-73) 129/73    Intake & Output (last 3 days)       03/01 0701 - 03/02 0700 03/02 0701 - 03/03 0700 03/03 0701 - 03/04 0700 03/04 0701 - 03/05 0700    P.O. 240 240      I.V. (mL/kg) 6672 (88.6) 2169 (28.8) 1005.6 (12.2)     IV Piggyback  100 500     TPN   1212.6     Total Intake(mL/kg) 6912 (91.8) 2509 (33.3) 2718.2 (32.9)     Urine (mL/kg/hr)   1650 (0.8)     Stool  1 (0) 0 (0)     Total Output   1 1650      Net +6912 +2508 +1068.2              Unmeasured Urine Occurrence 8 x 8 x 6 x     Unmeasured Stool Occurrence 1 x 2 x 1 x           Physical Exam:     General Appearance:    Alert, cooperative, in no acute distress   Lungs:     Clear to auscultation,respirations regular, even and                  unlaboredVery minimal rales at the base on the right     Heart:    Regular rhythm and normal rate, normal S1 and S2, no            murmur, no gallop, no rub, no click   Chest Wall:    No abnormalities observed   Abdomen:    Normal bowel sounds, tenderness in her right mid abdomen, lateral to her umbilicus, she states that this is better but is still present, white count is reduced to 8.  And potassium is improved to 3.6.          Results Review:      I reviewed the patient's new clinical results.  I reviewed the patient's new imaging results and agree with the interpretation.      Results from last 7 days  Lab Units 03/04/18  0507 03/03/18 0459 03/02/18  0704   WBC 10*3/mm3 8.39 9.84 12.48*   HEMOGLOBIN g/dL 8.9* 8.6* 8.7*   HEMATOCRIT % 26.5* 25.5* 25.5*   PLATELETS 10*3/mm3 378 375 387          Results from last 7 days  Lab Units 03/04/18  0507 03/03/18  0459 03/02/18  0704   SODIUM mmol/L 142 142 143  143   POTASSIUM mmol/L 3.6 3.2* 3.4*  3.4*   CHLORIDE mmol/L 104 106 105  105   CO2 mmol/L 30.0 29.0 29.0  29.0   BUN mg/dL 10 11 19  19   CREATININE mg/dL 0.74 0.67 0.72  0.72   CALCIUM mg/dL 8.9 8.7 8.6  8.6   BILIRUBIN mg/dL 0.2 0.3 0.5   ALK PHOS U/L 56 60 66   ALT (SGPT) U/L 30 27 21   AST (SGOT) U/L 33 40 36   GLUCOSE mg/dL 144* 130* 103*  103*       Assessment/Plan     Assessment/Plan     Active Problems:    Appendicitis with perforation      Currently she will be placed on a regular diet, she can use her CPAP, we will replace her potassium, no other changes  currently ESR is 49.      Tunde Pink MD  03/04/18  8:01 AM      Time: time spent with patient 15 minutes     EMR Dragon/Transcription disclaimer: Much of this encounter note is an electronic transcription/translation of spoken language to printed text. The electronic translation of spoken language may permit erroneous, or at times, nonsensical words or phrases to be inadvertently transcribed; although I have reviewed the note for such errors, some may still exist.

## 2018-03-05 LAB
ALBUMIN SERPL-MCNC: 2.6 G/DL (ref 3.5–5)
ALBUMIN/GLOB SERPL: 0.8 G/DL (ref 1.1–2.5)
ALP SERPL-CCNC: 57 U/L (ref 24–120)
ALT SERPL W P-5'-P-CCNC: 20 U/L (ref 0–54)
ANION GAP SERPL CALCULATED.3IONS-SCNC: 7 MMOL/L (ref 4–13)
AST SERPL-CCNC: 35 U/L (ref 7–45)
BILIRUB SERPL-MCNC: 0.1 MG/DL (ref 0.1–1)
BUN BLD-MCNC: 11 MG/DL (ref 5–21)
BUN/CREAT SERPL: 16.7 (ref 7–25)
CA-I BLD-MCNC: 4.79 MG/DL (ref 4.6–5.4)
CALCIUM SPEC-SCNC: 8.5 MG/DL (ref 8.4–10.4)
CHLORIDE SERPL-SCNC: 106 MMOL/L (ref 98–110)
CO2 SERPL-SCNC: 27 MMOL/L (ref 24–31)
CREAT BLD-MCNC: 0.66 MG/DL (ref 0.5–1.4)
CRP SERPL-MCNC: 3.28 MG/DL (ref 0–0.99)
DEPRECATED RDW RBC AUTO: 44.1 FL (ref 40–54)
ERYTHROCYTE [DISTWIDTH] IN BLOOD BY AUTOMATED COUNT: 13.1 % (ref 12–15)
GFR SERPL CREATININE-BSD FRML MDRD: 107 ML/MIN/1.73
GLOBULIN UR ELPH-MCNC: 3.4 GM/DL
GLUCOSE BLD-MCNC: 141 MG/DL (ref 70–100)
GLUCOSE BLDC GLUCOMTR-MCNC: 130 MG/DL (ref 70–130)
GLUCOSE BLDC GLUCOMTR-MCNC: 151 MG/DL (ref 70–130)
HCT VFR BLD AUTO: 27 % (ref 37–47)
HGB BLD-MCNC: 9.2 G/DL (ref 12–16)
Lab: NORMAL
MAGNESIUM SERPL-MCNC: 1.6 MG/DL (ref 1.4–2.2)
MCH RBC QN AUTO: 32.5 PG (ref 28–32)
MCHC RBC AUTO-ENTMCNC: 34.1 G/DL (ref 33–36)
MCV RBC AUTO: 95.4 FL (ref 82–98)
PHOSPHATE SERPL-MCNC: 3.6 MG/DL (ref 2.5–4.5)
PLATELET # BLD AUTO: 356 10*3/MM3 (ref 130–400)
PMV BLD AUTO: 10.2 FL (ref 6–12)
POTASSIUM BLD-SCNC: 3.6 MMOL/L (ref 3.5–5.3)
PREALB SERPL-MCNC: 16.2 MG/DL (ref 18–36)
PROT SERPL-MCNC: 6 G/DL (ref 6.3–8.7)
RBC # BLD AUTO: 2.83 10*6/MM3 (ref 4.2–5.4)
SODIUM BLD-SCNC: 140 MMOL/L (ref 135–145)
TRIGL SERPL-MCNC: 690 MG/DL (ref 0–149)
WBC NRBC COR # BLD: 8.82 10*3/MM3 (ref 4.8–10.8)

## 2018-03-05 PROCEDURE — 86140 C-REACTIVE PROTEIN: CPT | Performed by: SPECIALIST

## 2018-03-05 PROCEDURE — 82962 GLUCOSE BLOOD TEST: CPT

## 2018-03-05 PROCEDURE — 84100 ASSAY OF PHOSPHORUS: CPT | Performed by: SPECIALIST

## 2018-03-05 PROCEDURE — 82330 ASSAY OF CALCIUM: CPT

## 2018-03-05 PROCEDURE — 25010000002 ENOXAPARIN PER 10 MG: Performed by: SPECIALIST

## 2018-03-05 PROCEDURE — 85027 COMPLETE CBC AUTOMATED: CPT | Performed by: SPECIALIST

## 2018-03-05 PROCEDURE — 84134 ASSAY OF PREALBUMIN: CPT | Performed by: SPECIALIST

## 2018-03-05 PROCEDURE — 84478 ASSAY OF TRIGLYCERIDES: CPT | Performed by: SPECIALIST

## 2018-03-05 PROCEDURE — 80053 COMPREHEN METABOLIC PANEL: CPT | Performed by: SPECIALIST

## 2018-03-05 PROCEDURE — 25010000002 ERTAPENEM PER 500 MG: Performed by: SPECIALIST

## 2018-03-05 PROCEDURE — 83735 ASSAY OF MAGNESIUM: CPT | Performed by: SPECIALIST

## 2018-03-05 RX ORDER — METRONIDAZOLE 500 MG/1
500 TABLET ORAL 4 TIMES DAILY
Status: DISCONTINUED | OUTPATIENT
Start: 2018-03-05 | End: 2018-03-07 | Stop reason: HOSPADM

## 2018-03-05 RX ORDER — FAMOTIDINE 20 MG/1
20 TABLET, FILM COATED ORAL 2 TIMES DAILY
Status: DISCONTINUED | OUTPATIENT
Start: 2018-03-05 | End: 2018-03-07 | Stop reason: HOSPADM

## 2018-03-05 RX ADMIN — ASCORBIC ACID, VITAMIN A PALMITATE, CHOLECALCIFEROL, THIAMINE HYDROCHLORIDE, RIBOFLAVIN-5 PHOSPHATE SODIUM, PYRIDOXINE HYDROCHLORIDE, NIACINAMIDE, DEXPANTHENOL, ALPHA-TOCOPHEROL ACETATE, VITAMIN K1, FOLIC ACID, BIOTIN, CYANOCOBALAMIN: 200; 3300; 200; 6; 3.6; 6; 40; 15; 10; 150; 600; 60; 5 INJECTION, SOLUTION INTRAVENOUS at 17:02

## 2018-03-05 RX ADMIN — METRONIDAZOLE 500 MG: 500 TABLET ORAL at 17:01

## 2018-03-05 RX ADMIN — METRONIDAZOLE 500 MG: 500 INJECTION, SOLUTION INTRAVENOUS at 03:43

## 2018-03-05 RX ADMIN — ENOXAPARIN SODIUM 40 MG: 100 INJECTION SUBCUTANEOUS at 05:19

## 2018-03-05 RX ADMIN — SUCRALFATE 1 G: 1 SUSPENSION ORAL at 05:19

## 2018-03-05 RX ADMIN — SUCRALFATE 1 G: 1 SUSPENSION ORAL at 00:22

## 2018-03-05 RX ADMIN — METRONIDAZOLE 500 MG: 500 TABLET ORAL at 13:12

## 2018-03-05 RX ADMIN — FAMOTIDINE 20 MG: 10 INJECTION INTRAVENOUS at 09:44

## 2018-03-05 RX ADMIN — SUCRALFATE 1 G: 1 SUSPENSION ORAL at 11:06

## 2018-03-05 RX ADMIN — FAMOTIDINE 20 MG: 20 TABLET, FILM COATED ORAL at 20:16

## 2018-03-05 RX ADMIN — SODIUM CHLORIDE 1 G: 900 INJECTION INTRAVENOUS at 20:16

## 2018-03-05 RX ADMIN — METRONIDAZOLE 500 MG: 500 INJECTION, SOLUTION INTRAVENOUS at 09:44

## 2018-03-05 RX ADMIN — METRONIDAZOLE 500 MG: 500 TABLET ORAL at 20:16

## 2018-03-05 NOTE — PLAN OF CARE
Problem: Patient Care Overview (Adult)  Goal: Plan of Care Review  Outcome: Ongoing (interventions implemented as appropriate)   03/05/18 1432   Coping/Psychosocial Response Interventions   Plan Of Care Reviewed With patient;spouse   Patient Care Overview   Progress improving   Outcome Evaluation   Outcome Summary/Follow up Plan TRIG elevated at 690. This RD d/c'd lipids d/t elevated triglycerides. Cont with D15% Aa5% at 62mL/hour. Diet has been advanced to a regular diet. She reports that she is taking it slow with her intake, but is tolerating what she is consuming. No po intake recorded since diet upgrade. Will cont to follow for nutrition needs and nutrition labs. Will make changes as needed.        Problem: Nutrition, Parenteral (Adult)  Goal: Signs and Symptoms of Listed Potential Problems Will be Absent or Manageable (Nutrition, Parenteral)  Outcome: Ongoing (interventions implemented as appropriate)

## 2018-03-05 NOTE — PLAN OF CARE
Problem: Patient Care Overview (Adult)  Goal: Plan of Care Review  Outcome: Ongoing (interventions implemented as appropriate)  Ivf, tpn, lipids and iv abx cont. Tolerating po intake. bld sugar 151 at midnight, cont to monitor. Voiding without difficulty. Up ad rosa isela. Anxious to go home. No distress noted. Cont to monitor.   03/04/18 0148 03/05/18 0245   Coping/Psychosocial Response Interventions   Plan Of Care Reviewed With --  patient   Patient Care Overview   Progress --  improving   Outcome Evaluation   Outcome Summary/Follow up Plan Pt denies pain or n/v. TPN and Lipids infusing. Acuchecks q 6. 147 at 0000. Voiding. Continue to monitor --      Goal: Adult Individualization and Mutuality  Outcome: Ongoing (interventions implemented as appropriate)    Goal: Discharge Needs Assessment  Outcome: Ongoing (interventions implemented as appropriate)      Problem: Appendicitis/Appendectomy (Adult)  Goal: Signs and Symptoms of Listed Potential Problems Will be Absent or Manageable (Appendicitis/Appendectomy)  Outcome: Ongoing (interventions implemented as appropriate)      Problem: Nutrition, Parenteral (Adult)  Goal: Signs and Symptoms of Listed Potential Problems Will be Absent or Manageable (Nutrition, Parenteral)  Outcome: Ongoing (interventions implemented as appropriate)      Problem: Diarrhea (Adult)  Goal: Improved/Reduced Symptoms  Outcome: Ongoing (interventions implemented as appropriate)

## 2018-03-05 NOTE — PROGRESS NOTES
Jackie Grewal MD FACS  Progress Note     LOS: 7 days   Patient Care Team:  Nando Pope MD as PCP - General (Family Medicine)      Subjective     Interval History:      feeling much better.  Eating reg diet.  Soft stools     Objective     Vital Signs  Temp:  [97.4 °F (36.3 °C)-98.3 °F (36.8 °C)] 98.3 °F (36.8 °C)  Heart Rate:  [63-71] 71  Resp:  [16-18] 16  BP: (119-140)/(64-76) 123/76    Physical Exam:  General appearance - alert, well appearing, and in no distress  Abdomen - softer, decreased distension, much decreased RLQ pain      Results Review:    Lab Results (last 24 hours)     Procedure Component Value Units Date/Time    POC Glucose Once [[id number]]  (Abnormal) Collected:  03/04/18 1704    Specimen:  Blood Updated:  03/04/18 1715     Glucose 132 (H) mg/dL       : [id number] Nixon CalvossicaMeter ID: [id number]       POC Glucose Once [[id number]]  (Abnormal) Collected:  03/05/18 0027    Specimen:  Blood Updated:  03/05/18 0046     Glucose 151 (H) mg/dL       : [id number] Lilian ChristianonMeter ID: [id number]       CBC (No Diff) [[id number]]  (Abnormal) Collected:  03/05/18 0455    Specimen:  Blood Updated:  03/05/18 0532     WBC 8.82 10*3/mm3      RBC 2.83 (L) 10*6/mm3      Hemoglobin 9.2 (L) g/dL      Hematocrit 27.0 (L) %      MCV 95.4 fL      MCH 32.5 (H) pg      MCHC 34.1 g/dL      RDW 13.1 %      RDW-SD 44.1 fl      MPV 10.2 fL      Platelets 356 10*3/mm3     Prealbumin [[id number]]  (Abnormal) Collected:  03/05/18 0455    Specimen:  Blood Updated:  03/05/18 0559     Prealbumin 16.2 (L) mg/dL     Comprehensive Metabolic Panel [[id number]]  (Abnormal) Collected:  03/05/18 0455    Specimen:  Blood Updated:  03/05/18 0601     Glucose 141 (H) mg/dL      BUN 11 mg/dL      Creatinine 0.66 mg/dL      Sodium 140 mmol/L      Potassium 3.6 mmol/L      Chloride 106 mmol/L      CO2 27.0 mmol/L      Calcium 8.5 mg/dL      Total Protein 6.0 (L) g/dL      Albumin 2.60 (L) g/dL      ALT (SGPT) 20 U/L      AST (SGOT)  35 U/L      Alkaline Phosphatase 57 U/L      Total Bilirubin 0.1 mg/dL      eGFR  African Amer 107 mL/min/1.73      Globulin 3.4 gm/dL      A/G Ratio 0.8 (L) g/dL      BUN/Creatinine Ratio 16.7     Anion Gap 7.0 mmol/L     Narrative:       The MDRD GFR formula is only valid for adults with stable renal function between ages 18 and 70.    C-reactive Protein [[id number]]  (Abnormal) Collected:  03/05/18 0455    Specimen:  Blood Updated:  03/05/18 0601     C-Reactive Protein 3.28 (H) mg/dL     Magnesium [[id number]]  (Normal) Collected:  03/05/18 0455    Specimen:  Blood Updated:  03/05/18 0601     Magnesium 1.6 mg/dL     Phosphorus [[id number]]  (Normal) Collected:  03/05/18 0455    Specimen:  Blood Updated:  03/05/18 0601     Phosphorus 3.6 mg/dL     Triglycerides [[id number]]  (Abnormal) Collected:  03/05/18 0455    Specimen:  Blood Updated:  03/05/18 0652     Triglycerides 690 (H) mg/dL     Calcium, Ionized [[id number]] Collected:  03/05/18 0830    Specimen:  Blood Updated:  03/05/18 0918     Ionized Calcium 4.79 mg/dL      Collected by [id number]        Imaging Results (last 24 hours)     ** No results found for the last 24 hours. **            Assessment/Plan       Diet as tolerated.  Check ct in am.      Jackie Grewal MD  03/05/18  1:25 PM

## 2018-03-05 NOTE — PLAN OF CARE
Problem: Patient Care Overview (Adult)  Goal: Plan of Care Review  Outcome: Ongoing (interventions implemented as appropriate)  Pt awake and alert. Denies pain. Pt reports diarrhea is much less frequent. Per patient report, stools are loose brown. IV abx changed to PO. Continue TPN. Lipids d/c'd. Tolerating regular diet. Voiding without difficulty. Pt is refusing every 6 hour accu check at 1200 today, patient stated she has been stuck 20 times today. Pt is up ambulating in culp.   03/05/18 1222   Coping/Psychosocial Response Interventions   Plan Of Care Reviewed With patient   Patient Care Overview   Progress improving     Goal: Adult Individualization and Mutuality  Outcome: Ongoing (interventions implemented as appropriate)   03/05/18 1222   Individualization   Patient Specific Preferences Keep door closed.   Patient Specific Goals Infection resolved. Diarrhea resolved. Tolerating diet.   Patient Specific Interventions IVF. antibiotics. Increased activity.     Goal: Discharge Needs Assessment  Outcome: Ongoing (interventions implemented as appropriate)      Problem: Appendicitis/Appendectomy (Adult)  Goal: Signs and Symptoms of Listed Potential Problems Will be Absent or Manageable (Appendicitis/Appendectomy)  Outcome: Ongoing (interventions implemented as appropriate)   03/05/18 1222   Appendicitis/Appendectomy   Problems Assessed (Appendicitis/Appendectomy) all   Problems Present (Appendicitis/Appendectomy) infection       Problem: Nutrition, Parenteral (Adult)  Goal: Signs and Symptoms of Listed Potential Problems Will be Absent or Manageable (Nutrition, Parenteral)  Outcome: Ongoing (interventions implemented as appropriate)   03/05/18 1222   Nutrition, Parenteral   Problems Assessed (Parenteral Nutrition) all   Problems Present (Parenteral Nutrition) none       Problem: Diarrhea (Adult)  Goal: Identify Related Risk Factors and Signs and Symptoms  Outcome: Ongoing (interventions implemented as appropriate)    03/05/18 1222   Diarrhea   Diarrhea: Related Risk Factors antibiotics;infectious process   Signs and Symptoms (Diarrhea) increased stool frequency, amount, and fluidity;malaise     Goal: Improved/Reduced Symptoms  Outcome: Ongoing (interventions implemented as appropriate)   03/05/18 1222   Diarrhea (Adult)   Improved/Reduced Symptoms making progress toward outcome

## 2018-03-05 NOTE — PROGRESS NOTES
Continued Stay Note  TEE Winters     Patient Name: Arnie Ross  MRN: [id number]  Today's Date: 3/5/2018    Admit Date: 2/26/2018          Discharge Plan       03/05/18 6271    Case Management/Social Work Plan    Plan HOME    Patient/Family In Agreement With Plan yes    Additional Comments REVIEWED CHART; NOTED PT'S IMPROVEMENT AND TOLERATING REG DIET.  WILL CONT. TO FOLLOW FOR ANY D/C PLANNING NEEDS THAT MAY ARISE.  PLEASE ADVISE.  THANKS.               Discharge Codes     None            GENTRY Bansal

## 2018-03-06 ENCOUNTER — APPOINTMENT (OUTPATIENT)
Dept: CT IMAGING | Facility: HOSPITAL | Age: 73
End: 2018-03-06

## 2018-03-06 LAB
ANION GAP SERPL CALCULATED.3IONS-SCNC: 5 MMOL/L (ref 4–13)
BUN BLD-MCNC: 15 MG/DL (ref 5–21)
BUN/CREAT SERPL: 21.4 (ref 7–25)
CALCIUM SPEC-SCNC: 8.6 MG/DL (ref 8.4–10.4)
CHLORIDE SERPL-SCNC: 109 MMOL/L (ref 98–110)
CO2 SERPL-SCNC: 27 MMOL/L (ref 24–31)
CREAT BLD-MCNC: 0.7 MG/DL (ref 0.5–1.4)
DEPRECATED RDW RBC AUTO: 45.7 FL (ref 40–54)
ERYTHROCYTE [DISTWIDTH] IN BLOOD BY AUTOMATED COUNT: 14.5 % (ref 12–15)
GFR SERPL CREATININE-BSD FRML MDRD: 100 ML/MIN/1.73
GLUCOSE BLD-MCNC: 120 MG/DL (ref 70–100)
GLUCOSE BLDC GLUCOMTR-MCNC: 127 MG/DL (ref 70–130)
GLUCOSE BLDC GLUCOMTR-MCNC: 131 MG/DL (ref 70–130)
HCT VFR BLD AUTO: 26.7 % (ref 37–47)
HGB BLD-MCNC: 8.7 G/DL (ref 12–16)
MCH RBC QN AUTO: 31.3 PG (ref 28–32)
MCHC RBC AUTO-ENTMCNC: 32.6 G/DL (ref 33–36)
MCV RBC AUTO: 96 FL (ref 82–98)
PLATELET # BLD AUTO: 303 10*3/MM3 (ref 130–400)
PMV BLD AUTO: 10.4 FL (ref 6–12)
POTASSIUM BLD-SCNC: 3.7 MMOL/L (ref 3.5–5.3)
RBC # BLD AUTO: 2.78 10*6/MM3 (ref 4.2–5.4)
SODIUM BLD-SCNC: 141 MMOL/L (ref 135–145)
WBC NRBC COR # BLD: 8.18 10*3/MM3 (ref 4.8–10.8)

## 2018-03-06 PROCEDURE — 82962 GLUCOSE BLOOD TEST: CPT

## 2018-03-06 PROCEDURE — 0 IOHEXOL 300 MG/ML SOLUTION: Performed by: SPECIALIST

## 2018-03-06 PROCEDURE — 25010000002 ERTAPENEM PER 500 MG: Performed by: SPECIALIST

## 2018-03-06 PROCEDURE — 25010000002 ENOXAPARIN PER 10 MG: Performed by: SPECIALIST

## 2018-03-06 PROCEDURE — 80048 BASIC METABOLIC PNL TOTAL CA: CPT | Performed by: SPECIALIST

## 2018-03-06 PROCEDURE — 74177 CT ABD & PELVIS W/CONTRAST: CPT

## 2018-03-06 PROCEDURE — 36415 COLL VENOUS BLD VENIPUNCTURE: CPT | Performed by: SPECIALIST

## 2018-03-06 PROCEDURE — 0 IOPAMIDOL 61 % SOLUTION: Performed by: SPECIALIST

## 2018-03-06 PROCEDURE — 85027 COMPLETE CBC AUTOMATED: CPT | Performed by: SPECIALIST

## 2018-03-06 RX ADMIN — METRONIDAZOLE 500 MG: 500 TABLET ORAL at 12:44

## 2018-03-06 RX ADMIN — SODIUM CHLORIDE 1 G: 900 INJECTION INTRAVENOUS at 20:00

## 2018-03-06 RX ADMIN — METRONIDAZOLE 500 MG: 500 TABLET ORAL at 20:00

## 2018-03-06 RX ADMIN — FAMOTIDINE 20 MG: 20 TABLET, FILM COATED ORAL at 08:25

## 2018-03-06 RX ADMIN — FAMOTIDINE 20 MG: 20 TABLET, FILM COATED ORAL at 20:00

## 2018-03-06 RX ADMIN — IOHEXOL 50 ML: 300 INJECTION, SOLUTION INTRAVENOUS at 07:00

## 2018-03-06 RX ADMIN — IOPAMIDOL 100 ML: 612 INJECTION, SOLUTION INTRAVENOUS at 09:45

## 2018-03-06 RX ADMIN — ENOXAPARIN SODIUM 40 MG: 100 INJECTION SUBCUTANEOUS at 05:45

## 2018-03-06 RX ADMIN — METRONIDAZOLE 500 MG: 500 TABLET ORAL at 08:25

## 2018-03-06 RX ADMIN — METRONIDAZOLE 500 MG: 500 TABLET ORAL at 18:01

## 2018-03-06 NOTE — PLAN OF CARE
Problem: Patient Care Overview (Adult)  Goal: Plan of Care Review  Outcome: Ongoing (interventions implemented as appropriate)   03/06/18 1632   Coping/Psychosocial Response Interventions   Plan Of Care Reviewed With patient;spouse   Patient Care Overview   Progress improving   Outcome Evaluation   Outcome Summary/Follow up Plan No c/o pain or nausea. Advanced to regular diet, tolerating well. TPN currently being weaned; will take down later this shift. PO abx continue. CT abd pelvis showed RLQ abscess with inflammatory changes releated to perf appendicitis, appears decreased in size, trace pleural effusions. Ambulated multiple times today. VSS. Will continue to monitor.       Problem: Appendicitis/Appendectomy (Adult)  Goal: Signs and Symptoms of Listed Potential Problems Will be Absent or Manageable (Appendicitis/Appendectomy)  Outcome: Ongoing (interventions implemented as appropriate)   03/06/18 1632   Appendicitis/Appendectomy   Problems Assessed (Appendicitis/Appendectomy) all   Problems Present (Appendicitis/Appendectomy) infection

## 2018-03-06 NOTE — PROGRESS NOTES
Jackie Grewal MD FACS  Progress Note     LOS: 8 days   Patient Care Team:  Nando Pope MD as PCP - General (Family Medicine)      Subjective     Interval History:      no nausea.  Pain improved.  Poor appetite     Objective     Vital Signs  Temp:  [98.2 °F (36.8 °C)-98.4 °F (36.9 °C)] 98.2 °F (36.8 °C)  Heart Rate:  [75-84] 75  Resp:  [18] 18  BP: (124-134)/(66-73) 128/66    Physical Exam:  General appearance - alert, well appearing, and in no distress  Abdomen - soft, still some point tenderness RLQ, nondistended      Results Review:    Lab Results (last 24 hours)     Procedure Component Value Units Date/Time    POC Glucose Once [[id number]]  (Normal) Collected:  03/05/18 1650    Specimen:  Blood Updated:  03/05/18 1702     Glucose 130 mg/dL       : [id number] [facility] ID: [id number]       POC Glucose Once [[id number]]  (Abnormal) Collected:  03/05/18 2354    Specimen:  Blood Updated:  03/06/18 0011     Glucose 131 (H) mg/dL       [facility] [id number] [facility] [id number]       CBC (No Diff) [[id number]]  (Abnormal) Collected:  03/06/18 0507    Specimen:  Blood Updated:  03/06/18 0548     WBC 8.18 10*3/mm3      RBC 2.78 (L) 10*6/mm3      Hemoglobin 8.7 (L) g/dL      Hematocrit 26.7 (L) %      MCV 96.0 fL      MCH 31.3 pg      MCHC 32.6 (L) g/dL      RDW 14.5 %      RDW-SD 45.7 fl      MPV 10.4 fL      Platelets 303 10*3/mm3     Basic Metabolic Panel [[id number]]  (Abnormal) Collected:  03/06/18 0507    Specimen:  Blood Updated:  03/06/18 0601     Glucose 120 (H) mg/dL      BUN 15 mg/dL      Creatinine 0.70 mg/dL      Sodium 141 mmol/L      Potassium 3.7 mmol/L      Chloride 109 mmol/L      CO2 27.0 mmol/L      Calcium 8.6 mg/dL      eGFR  African Amer 100 mL/min/1.73      BUN/Creatinine Ratio 21.4     Anion Gap 5.0 mmol/L     Narrative:       The MDRD GFR formula is only valid for adults with stable renal function between ages 18 and 70.        Imaging Results (last 24 hours)     Procedure  Component Value Units Date/Time    CT Abdomen Pelvis With Contrast [[id number]] Collected:  03/06/18 0916     Updated:  03/06/18 0939    Narrative:       EXAMINATION: CT ABDOMEN PELVIS W CONTRAST- 3/6/2018 9:16 AM CST     HISTORY: Follow-up perforated appendicitis     COMPARISON: CT abdomen pelvis without contrast 2/26/2018     DOSE: 352 mGy-cm     TECHNIQUE: Sequential imaging was performed from the lung bases through  the pubic symphysis after the administration of IV contrast.  Sagittal  and coronal reformations were made from the original source data and  reviewed. Automated exposure control was also utilized to decrease  patient radiation dose.     FINDINGS:   The visualized heart appears normal in size. Trace pleural fluid is seen  bilaterally. There is associated compressive atelectasis at the lung  bases.     The gallbladder is surgically absent. The liver, spleen, pancreas, and  adrenal glands are grossly normal appearance. A left renal cyst is again  seen. The kidneys otherwise appear grossly normal. The ureters appear  decompressed bilaterally.     The main portal and splenic veins and IVC are grossly normal in  appearance. Abdominal aorta appears normal in caliber. There are few  scattered atherosclerotic calcifications of the aorta and its branch  vessels. The origins of the celiac axis, superior mesenteric artery, and  inferior mesenteric artery enhance normally.     There is no appreciable central mesenteric or retroperitoneal  lymphadenopathy.     There is a small hiatal hernia. The stomach otherwise appears grossly  normal. Small bowel appears grossly normal. The large bowel is mostly  decompressed. There are a few scattered colonic diverticula without  evidence of diverticulitis. The appendix remains markedly abnormal in  appearance. There is wall thickening of the appendix, with the appendix  measuring up to 1.7 cm in diameter. Some contrast does extend into the  proximal aspect of the appendix.  There is evidence of perforation at the  distal tip at the appendix. The organized fluid collection in the right  lower quadrant extends between the appendix and the psoas muscle and  measures 4.8 x 3.5 x 4.2 cm in size, previously measuring approximately  7.1 x 4.1 x 4.9 cm in size. There is some internal air as well as  surrounding inflammatory change. This is in contact with the right psoas  muscle without extension into the muscle itself. There is a small  fat-containing umbilical hernia. Air is seen within the subcutaneous  soft tissues of the abdomen, likely related to recent injections.     The urinary bladder is grossly normal in appearance. The uterus and  ovaries are unremarkable. No free fluid is seen in the pelvis. No pelvic  or inguinal lymphadenopathy is appreciated.     Review of the visualized osseous structures demonstrates no acute or  aggressive lesions. Diffuse sclerosis throughout the left ilium is again  seen, possibly related to fibrous dysplasia or Paget's disease.       Impression:       1. Redemonstration of a right lower quadrant abscess with surrounding  inflammatory change related to perforated appendicitis. This appears  slightly decreased in size compared to the previous exam, with  dimensions given above.  2. Colonic diverticulosis without evidence of diverticulitis.  3. Trace pleural effusions bilaterally.           This report was finalized on 03/06/2018 09:36 by Dr. Ian Major MD.            Assessment/Plan       CT improved.  Will wean TPN and plan to discharge tomorrow.      Jackie Grewal MD  03/06/18  11:39 AM

## 2018-03-06 NOTE — PLAN OF CARE
Problem: Patient Care Overview (Adult)  Goal: Plan of Care Review  Outcome: Ongoing (interventions implemented as appropriate)   03/06/18 0303   Coping/Psychosocial Response Interventions   Plan Of Care Reviewed With patient;spouse   Patient Care Overview   Progress no change   Outcome Evaluation   Outcome Summary/Follow up Plan Pt. denies pain and nausea so far this shift; NPO since midnight for CT scan this AM; TPN and IV abx continue as ordered; will continue to monitor.     Goal: Adult Individualization and Mutuality  Outcome: Ongoing (interventions implemented as appropriate)    Goal: Discharge Needs Assessment  Outcome: Ongoing (interventions implemented as appropriate)      Problem: Appendicitis/Appendectomy (Adult)  Goal: Signs and Symptoms of Listed Potential Problems Will be Absent or Manageable (Appendicitis/Appendectomy)  Outcome: Ongoing (interventions implemented as appropriate)      Problem: Nutrition, Parenteral (Adult)  Goal: Signs and Symptoms of Listed Potential Problems Will be Absent or Manageable (Nutrition, Parenteral)  Outcome: Ongoing (interventions implemented as appropriate)      Problem: Diarrhea (Adult)  Goal: Identify Related Risk Factors and Signs and Symptoms  Outcome: Outcome(s) achieved Date Met: 03/06/18    Goal: Improved/Reduced Symptoms  Outcome: Ongoing (interventions implemented as appropriate)

## 2018-03-06 NOTE — PLAN OF CARE
Problem: Nutrition, Parenteral (Adult)  Goal: Signs and Symptoms of Listed Potential Problems Will be Absent or Manageable (Nutrition, Parenteral)  Outcome: Ongoing (interventions implemented as appropriate)  TPN currently being weaned; will take down tonight.     Problem: Diarrhea (Adult)  Goal: Improved/Reduced Symptoms  Outcome: Ongoing (interventions implemented as appropriate)   03/06/18 1631   Diarrhea (Adult)   Improved/Reduced Symptoms making progress toward outcome

## 2018-03-07 VITALS
SYSTOLIC BLOOD PRESSURE: 129 MMHG | RESPIRATION RATE: 16 BRPM | TEMPERATURE: 98.6 F | OXYGEN SATURATION: 95 % | HEART RATE: 77 BPM | WEIGHT: 184.53 LBS | BODY MASS INDEX: 32.7 KG/M2 | DIASTOLIC BLOOD PRESSURE: 68 MMHG | HEIGHT: 63 IN

## 2018-03-07 LAB
ANION GAP SERPL CALCULATED.3IONS-SCNC: 9 MMOL/L (ref 4–13)
BUN BLD-MCNC: 16 MG/DL (ref 5–21)
BUN/CREAT SERPL: 20.5 (ref 7–25)
CALCIUM SPEC-SCNC: 8.9 MG/DL (ref 8.4–10.4)
CHLORIDE SERPL-SCNC: 109 MMOL/L (ref 98–110)
CO2 SERPL-SCNC: 23 MMOL/L (ref 24–31)
CREAT BLD-MCNC: 0.78 MG/DL (ref 0.5–1.4)
DEPRECATED RDW RBC AUTO: 47.6 FL (ref 40–54)
ERYTHROCYTE [DISTWIDTH] IN BLOOD BY AUTOMATED COUNT: 15.2 % (ref 12–15)
GFR SERPL CREATININE-BSD FRML MDRD: 88 ML/MIN/1.73
GLUCOSE BLD-MCNC: 92 MG/DL (ref 70–100)
GLUCOSE BLDC GLUCOMTR-MCNC: 106 MG/DL (ref 70–130)
HCT VFR BLD AUTO: 27.7 % (ref 37–47)
HGB BLD-MCNC: 9.1 G/DL (ref 12–16)
MCH RBC QN AUTO: 31.5 PG (ref 28–32)
MCHC RBC AUTO-ENTMCNC: 32.9 G/DL (ref 33–36)
MCV RBC AUTO: 95.8 FL (ref 82–98)
PLATELET # BLD AUTO: 300 10*3/MM3 (ref 130–400)
PMV BLD AUTO: 10.4 FL (ref 6–12)
POTASSIUM BLD-SCNC: 4 MMOL/L (ref 3.5–5.3)
RBC # BLD AUTO: 2.89 10*6/MM3 (ref 4.2–5.4)
SODIUM BLD-SCNC: 141 MMOL/L (ref 135–145)
TRIGL SERPL-MCNC: 645 MG/DL (ref 0–149)
WBC NRBC COR # BLD: 8.19 10*3/MM3 (ref 4.8–10.8)

## 2018-03-07 PROCEDURE — 85027 COMPLETE CBC AUTOMATED: CPT | Performed by: SPECIALIST

## 2018-03-07 PROCEDURE — 80048 BASIC METABOLIC PNL TOTAL CA: CPT | Performed by: SPECIALIST

## 2018-03-07 PROCEDURE — 25010000002 ENOXAPARIN PER 10 MG: Performed by: SPECIALIST

## 2018-03-07 PROCEDURE — 84478 ASSAY OF TRIGLYCERIDES: CPT | Performed by: SPECIALIST

## 2018-03-07 RX ADMIN — METRONIDAZOLE 500 MG: 500 TABLET ORAL at 09:27

## 2018-03-07 RX ADMIN — METRONIDAZOLE 500 MG: 500 TABLET ORAL at 12:12

## 2018-03-07 RX ADMIN — FAMOTIDINE 20 MG: 20 TABLET, FILM COATED ORAL at 09:27

## 2018-03-07 RX ADMIN — ENOXAPARIN SODIUM 40 MG: 100 INJECTION SUBCUTANEOUS at 05:36

## 2018-03-07 NOTE — PLAN OF CARE
Problem: Patient Care Overview (Adult)  Goal: Plan of Care Review  Outcome: Ongoing (interventions implemented as appropriate)   03/07/18 0950   Coping/Psychosocial Response Interventions   Plan Of Care Reviewed With patient   Patient Care Overview   Progress no change   Outcome Evaluation   Outcome Summary/Follow up Plan Pt cont with a poor to fair appetite. She is also very hesitant to increase her intake as she does not want a recurrence of pain. She has only consumed 25% of the last 2 meals recorded. Encouraged pt to gradually increase intake to regain appetite. TRIG remain elevated at 645. Cont to follow for nutrition needs.

## 2018-03-07 NOTE — PLAN OF CARE
Problem: Patient Care Overview (Adult)  Goal: Plan of Care Review  Outcome: Ongoing (interventions implemented as appropriate)   03/07/18 0134   Coping/Psychosocial Response Interventions   Plan Of Care Reviewed With patient;spouse   Patient Care Overview   Progress no change   Outcome Evaluation   Outcome Summary/Follow up Plan Pt. denies any pain or nausea so far this shift; IV and PO abx given; up ad rosa isela; voiding;  at bedside; anticipating discharge later today; will monitor.     Goal: Adult Individualization and Mutuality  Outcome: Ongoing (interventions implemented as appropriate)    Goal: Discharge Needs Assessment  Outcome: Ongoing (interventions implemented as appropriate)      Problem: Appendicitis/Appendectomy (Adult)  Goal: Signs and Symptoms of Listed Potential Problems Will be Absent or Manageable (Appendicitis/Appendectomy)  Outcome: Ongoing (interventions implemented as appropriate)      Problem: Nutrition, Parenteral (Adult)  Goal: Signs and Symptoms of Listed Potential Problems Will be Absent or Manageable (Nutrition, Parenteral)  Outcome: Ongoing (interventions implemented as appropriate)      Problem: Diarrhea (Adult)  Goal: Improved/Reduced Symptoms  Outcome: Ongoing (interventions implemented as appropriate)

## 2018-03-09 NOTE — DISCHARGE SUMMARY
Consults     No orders found from 1/28/2018 to 2/27/2018.      Jackie Grewal MD [facility] Discharge Summary    Date of Discharge:  3/9/2018    Discharge Diagnosis: perforated appendicitis    Presenting Problem/History of Present Illness  Appendicitis with perforation [K35.2]     Hospital Course  Patient is a 72 y.o. female presented to the ED upon transfer from an outlying facility with complaint of three weeks of abdominal pain, nausea, bloating, fevers, chills, and night sweats.  She was treated for constipation and urinary tract infection.  A CT a/p was performed and she was found to have perforated appendicitis.  She was instructed to present to this ED.  She was admitted, ivf resusciation was begun, ivf abx were started, and serial examinations were performed.  Her BP meds were held as she required multiple lactated ringer's boluses to normalize her bp.  TPN was administered.  Her diet was advanced as her abdominal pain and nausea resolved.  She was discharged on oral levaquin and flagyl with instructions to return in one week.      Procedures Performed         Consults:   Consults     No orders found from 1/28/2018 to 2/27/2018.          Condition on Discharge:  good    Vital Signs       Physical Exam:   See History and Physical found in chart.    Discharge Disposition  Home or Self Care    Discharge Medications   [place]   Home Medication Instructions MABEL:[id number]    Printed on:03/09/18 0659   Medication Information                      aspirin 81 MG EC tablet  Take 81 mg by mouth Every Night.             calcium carbonate (OS-SLIM) 600 MG tablet  Take 600 mg by mouth Daily.             cholecalciferol (VITAMIN D3) 1000 units tablet  Take 2,000 Units by mouth Daily.             fenofibrate 160 MG tablet  Take 160 mg by mouth Daily.             fexofenadine-pseudoephedrine (ALLEGRA-D)  MG per 12 hr tablet  Take 1 tablet by mouth Every 12 (Twelve) Hours As Needed for Allergies.              naproxen (NAPROSYN) 500 MG tablet  Take 500 mg by mouth 2 (Two) Times a Day As Needed for Moderate Pain .             Omega-3 Fatty Acids (FISH OIL) 1000 MG capsule capsule  Take 1,000 mg by mouth Daily With Breakfast.             potassium chloride (K-DUR,KLOR-CON) 10 MEQ CR tablet  Take 10 mEq by mouth Daily.             Prenatal MV-Min-Fe Fum-FA-DHA (PRENATAL+DHA PO)  Take 1 tablet by mouth Daily.             Prenatal Vit-Fe Fumarate-FA (PRENATAL FORMULA PO)  Take 1 tablet by mouth Daily.             valsartan-hydrochlorothiazide (DIOVAN-HCT) 320-25 MG per tablet  Take 1 tablet by mouth Daily.             vitamin E 400 UNIT capsule  Take 400 Units by mouth Daily.                 Discharge Diet:     Activity at Discharge:     Follow-up Appointments  No future appointments.      Test Results Pending at Discharge       Jackie Grewal MD  03/09/18  6:59 AM

## 2018-04-02 ENCOUNTER — TRANSCRIBE ORDERS (OUTPATIENT)
Dept: ADMINISTRATIVE | Facility: HOSPITAL | Age: 73
End: 2018-04-02

## 2018-04-02 DIAGNOSIS — K37 APPENDICITIS, UNSPECIFIED APPENDICITIS TYPE: Primary | ICD-10-CM

## 2018-04-05 ENCOUNTER — HOSPITAL ENCOUNTER (OUTPATIENT)
Dept: CT IMAGING | Facility: HOSPITAL | Age: 73
Discharge: HOME | End: 2018-04-05
Attending: SPECIALIST | Admitting: SPECIALIST

## 2018-04-05 DIAGNOSIS — K37 APPENDICITIS, UNSPECIFIED APPENDICITIS TYPE: ICD-10-CM

## 2018-04-05 LAB — CREAT BLDA-MCNC: 0.8 MG/DL (ref 0.6–1.3)

## 2018-04-05 PROCEDURE — 74177 CT ABD & PELVIS W/CONTRAST: CPT

## 2018-04-05 PROCEDURE — 82565 ASSAY OF CREATININE: CPT

## 2018-04-05 PROCEDURE — 25010000002 IOPAMIDOL 61 % SOLUTION: Performed by: SPECIALIST

## 2018-04-05 RX ADMIN — IOPAMIDOL 100 ML: 612 INJECTION, SOLUTION INTRAVENOUS at 09:15

## 2018-05-02 ENCOUNTER — TRANSCRIBE ORDERS (OUTPATIENT)
Dept: ADMINISTRATIVE | Facility: HOSPITAL | Age: 73
End: 2018-05-02

## 2018-05-02 DIAGNOSIS — R07.9 CHEST PAIN, UNSPECIFIED TYPE: Primary | ICD-10-CM

## 2018-05-03 ENCOUNTER — APPOINTMENT (OUTPATIENT)
Dept: PREADMISSION TESTING | Facility: HOSPITAL | Age: 73
End: 2018-05-03

## 2018-05-03 VITALS
HEART RATE: 52 BPM | OXYGEN SATURATION: 100 % | BODY MASS INDEX: 29.06 KG/M2 | DIASTOLIC BLOOD PRESSURE: 63 MMHG | SYSTOLIC BLOOD PRESSURE: 130 MMHG | HEIGHT: 63 IN | WEIGHT: 164.02 LBS

## 2018-05-03 LAB
ALBUMIN SERPL-MCNC: 4 G/DL (ref 3.5–5)
ALBUMIN/GLOB SERPL: 1.2 G/DL (ref 1.1–2.5)
ALP SERPL-CCNC: 106 U/L (ref 24–120)
ALT SERPL W P-5'-P-CCNC: 27 U/L (ref 0–54)
ANION GAP SERPL CALCULATED.3IONS-SCNC: 13 MMOL/L (ref 4–13)
AST SERPL-CCNC: 30 U/L (ref 7–45)
BASOPHILS # BLD MANUAL: 0.08 10*3/MM3 (ref 0–0.2)
BASOPHILS NFR BLD AUTO: 2 % (ref 0–2)
BILIRUB SERPL-MCNC: 0.5 MG/DL (ref 0.1–1)
BUN BLD-MCNC: 23 MG/DL (ref 5–21)
BUN/CREAT SERPL: 27.1 (ref 7–25)
CALCIUM SPEC-SCNC: 9.8 MG/DL (ref 8.4–10.4)
CHLORIDE SERPL-SCNC: 106 MMOL/L (ref 98–110)
CO2 SERPL-SCNC: 27 MMOL/L (ref 24–31)
CREAT BLD-MCNC: 0.85 MG/DL (ref 0.5–1.4)
DEPRECATED RDW RBC AUTO: 45.1 FL (ref 40–54)
EOSINOPHIL # BLD MANUAL: 0.08 10*3/MM3 (ref 0–0.7)
EOSINOPHIL NFR BLD MANUAL: 2 % (ref 0–4)
ERYTHROCYTE [DISTWIDTH] IN BLOOD BY AUTOMATED COUNT: 13.2 % (ref 12–15)
GFR SERPL CREATININE-BSD FRML MDRD: 80 ML/MIN/1.73
GLOBULIN UR ELPH-MCNC: 3.4 GM/DL
GLUCOSE BLD-MCNC: 95 MG/DL (ref 70–100)
HCT VFR BLD AUTO: 36.6 % (ref 37–47)
HGB BLD-MCNC: 12.5 G/DL (ref 12–16)
LYMPHOCYTES # BLD MANUAL: 1.96 10*3/MM3 (ref 0.72–4.86)
LYMPHOCYTES NFR BLD MANUAL: 16 % (ref 4–12)
LYMPHOCYTES NFR BLD MANUAL: 51 % (ref 15–45)
MCH RBC QN AUTO: 32.1 PG (ref 28–32)
MCHC RBC AUTO-ENTMCNC: 34.2 G/DL (ref 33–36)
MCV RBC AUTO: 94.1 FL (ref 82–98)
MONOCYTES # BLD AUTO: 0.62 10*3/MM3 (ref 0.19–1.3)
NEUTROPHILS # BLD AUTO: 1.12 10*3/MM3 (ref 1.87–8.4)
NEUTROPHILS NFR BLD MANUAL: 29 % (ref 39–78)
NRBC BLD MANUAL-RTO: 0 /100 WBC (ref 0–0)
PLAT MORPH BLD: NORMAL
PLATELET # BLD AUTO: 230 10*3/MM3 (ref 130–400)
PMV BLD AUTO: 10.6 FL (ref 6–12)
POTASSIUM BLD-SCNC: 3.9 MMOL/L (ref 3.5–5.3)
PROT SERPL-MCNC: 7.4 G/DL (ref 6.3–8.7)
RBC # BLD AUTO: 3.89 10*6/MM3 (ref 4.2–5.4)
RBC MORPH BLD: NORMAL
SODIUM BLD-SCNC: 146 MMOL/L (ref 135–145)
WBC MORPH BLD: NORMAL
WBC NRBC COR # BLD: 3.85 10*3/MM3 (ref 4.8–10.8)

## 2018-05-03 PROCEDURE — 93005 ELECTROCARDIOGRAM TRACING: CPT

## 2018-05-03 PROCEDURE — 93010 ELECTROCARDIOGRAM REPORT: CPT | Performed by: INTERNAL MEDICINE

## 2018-05-03 PROCEDURE — 36415 COLL VENOUS BLD VENIPUNCTURE: CPT

## 2018-05-03 PROCEDURE — 85007 BL SMEAR W/DIFF WBC COUNT: CPT | Performed by: SPECIALIST

## 2018-05-03 PROCEDURE — 85027 COMPLETE CBC AUTOMATED: CPT | Performed by: SPECIALIST

## 2018-05-03 PROCEDURE — 80053 COMPREHEN METABOLIC PANEL: CPT | Performed by: SPECIALIST

## 2018-05-03 RX ORDER — FENOFIBRATE 160 MG/1
160 TABLET ORAL DAILY
COMMUNITY
End: 2020-12-21

## 2018-05-03 NOTE — DISCHARGE INSTRUCTIONS
DAY OF SURGERY INSTRUCTIONS        YOUR SURGEON: ***Dr. Grewal    PROCEDURE: ***appendectomy laparoscopic     DATE OF SURGERY: ***5/4/18    ARRIVAL TIME: AS DIRECTED BY OFFICE    DAY OF SURGERY TAKE ONLY THESE MEDICATIONS UNLESS OTHERWISE INSTRUCTED BY YOUR PHYSICIAN: ***none        MANAGING PAIN AFTER SURGERY    We know you are probably wondering what your pain will be like after surgery.  Following surgery it is unrealistic to expect you will not have pain.   Pain is how our bodies let us know that something is wrong or cautions us to be careful.  That said, our goal is to make your pain tolerable.    Methods we may use to treat your pain include (oral or IV medications, PCAs, epidurals, nerve blocks, etc.)   While some procedures require IV pain medications for a short time after surgery, transitioning to pain medications by mouth allows for better management of pain.   Your nurse will encourage you to take oral pain medications whenever possible.  IV medications work almost immediately, but only last a short while.  Taking medications by mouth allows for a more constant level of medication in your blood stream for a longer period of time.      Once your pain is out of control it is harder to get back under control.  It is important you are aware when your next dose of pain medication is due.  If you are admitted, your nurse may write the time of your next dose on the white board in your room to help you remember.      We are interested in your pain and encourage you to inform us about aggravating factors during your visit.   Many times a simple repositioning every few hours can make a big difference.    If your physician says it is okay, do not let your pain prevent you from getting out of bed. Be sure to call your nurse for assistance prior to getting up so you do not fall.      Before surgery, please decide your tolerable pain goal.  These faces help describe the pain ratings we use on a 0-10 scale.   Be  prepared to tell us your goal and whether or not you take pain or anxiety medications at home.          BEFORE YOU COME TO THE HOSPITAL  (Pre-op instructions)  • Do not eat, drink, smoke or chew gum after midnight the night before surgery.  This also includes no mints.  • Morning of surgery take only the medicines you have been instructed with a sip of water unless otherwise instructed  by your physician.  • Do not shave, wear makeup or dark nail polish.  • Remove all jewelry including rings.  • Leave anything you consider valuable at home.  • Leave your suitcase in the car until after your surgery.  • Bring the following with you if applicable:  o Picture ID and insurance, Medicare or Medicaid cards  o Co-pay/deductible required by insurance (cash, check, credit card)  o Copy of advance directive, living will or power-of- documents if not brought to PAT  o CPAP or BIPAP mask and tubing  o Relaxation aids (MP3 player, book, magazine)  • On the day of surgery check in at registration located at the main entrance of the hospital.       Outpatient Surgery Guidelines, Adult  Outpatient procedures are those for which the person having the procedure is allowed to go home the same day as the procedure. Various procedures are done on an outpatient basis. You should follow some general guidelines if you will be having an outpatient procedure.  LET YOUR HEALTH CARE PROVIDER KNOW ABOUT:  · Any allergies you have.  · All medicines you are taking, including vitamins, herbs, eye drops, creams, and over-the-counter medicines.  · Previous problems you or members of your family have had with the use of anesthetics.  · Any blood disorders you have.  · Previous surgeries you have had.  · Medical conditions you have.  RISKS AND COMPLICATIONS  Your health care provider will discuss possible risks and complications with you before surgery. Common risks and complications include:    · Problems due to the use of  anesthetics.  · Blood loss and replacement (does not apply to minor surgical procedures).  · Temporary increase in pain due to surgery.  · Uncorrected pain or problems that the surgery was meant to correct.  · Infection.  · New damage.  BEFORE THE PROCEDURE  · Ask your health care provider about changing or stopping your regular medicines. You may need to stop taking certain medicines in the days or weeks before the procedure.  · Stop smoking at least 2 weeks before surgery. This lowers your risk for complications during and after surgery. Ask your health care provider for help with this if needed.  · Eat your usual meals and a light supper the day before surgery. Continue fluid intake. Do not drink alcohol.  · Do not eat or drink after midnight the night before your surgery.   · Arrange for someone to take you home and to stay with you for 24 hours after the procedure. Medicine given for your procedure may affect your ability to drive or to care for yourself.  · Call your health care provider's office if you develop an illness or problem that may prevent you from safely having your procedure.  AFTER THE PROCEDURE  After surgery, you will be taken to a recovery area, where your progress will be monitored. If there are no complications, you will be allowed to go home when you are awake, stable, and taking fluids well. You may have numbness around the surgical site. Healing will take some time. You will have tenderness at the surgical site and may have some swelling and bruising. You may also have some nausea.  HOME CARE INSTRUCTIONS  · Do not drive for 24 hours, or as directed by your health care provider. Do not drive while taking prescription pain medicines.  · Do not drink alcohol for 24 hours.  · Do not make important decisions or sign legal documents for 24 hours.  · You may resume a normal diet and activities as directed.  · Do not lift anything heavier than 10 pounds (4.5 kg) or play contact sports until your  health care provider says it is okay.  · Change your bandages (dressings) as directed.  · Only take over-the-counter or prescription medicines as directed by your health care provider.  · Follow up with your health care provider as directed.  SEEK MEDICAL CARE IF:  · You have increased bleeding (more than a small spot) from the surgical site.  · You have redness, swelling, or increasing pain in the wound.  · You see pus coming from the wound.  · You have a fever.  · You notice a bad smell coming from the wound or dressing.  · You feel lightheaded or faint.  · You develop a rash.  · You have trouble breathing.  · You develop allergies.  MAKE SURE YOU:  · Understand these instructions.  · Will watch your condition.  · Will get help right away if you are not doing well or get worse.     This information is not intended to replace advice given to you by your health care provider. Make sure you discuss any questions you have with your health care provider.     Document Released: 09/12/2002 Document Revised: 05/03/2016 Document Reviewed: 05/22/2014  [employer] Interactive Patient Education ©2016 [employer]       Fall Prevention in Hospitals, Adult  As a hospital patient, your condition and the treatments you receive can increase your risk for falls. Some additional risk factors for falls in a hospital include:  · Being in an unfamiliar environment.  · Being on bed rest.  · Your surgery.  · Taking certain medicines.  · Your tubing requirements, such as intravenous (IV) therapy or catheters.  It is important that you learn how to decrease fall risks while at the hospital. Below are important tips that can help prevent falls.  SAFETY TIPS FOR PREVENTING FALLS  Talk about your risk of falling.  · Ask your health care provider why you are at risk for falling. Is it your medicine, illness, tubing placement, or something else?  · Make a plan with your health care provider to keep you safe from falls.  · Ask your health care  provider or pharmacist about side effects of your medicines. Some medicines can make you dizzy or affect your coordination.  Ask for help.  · Ask for help before getting out of bed. You may need to press your call button.  · Ask for assistance in getting safely to the toilet.  · Ask for a walker or cane to be put at your bedside. Ask that most of the side rails on your bed be placed up before your health care provider leaves the room.  · Ask family or friends to sit with you.  · Ask for things that are out of your reach, such as your glasses, hearing aids, telephone, bedside table, or call button.  Follow these tips to avoid falling:  · Stay lying or seated, rather than standing, while waiting for help.  · Wear rubber-soled slippers or shoes whenever you walk in the hospital.  · Avoid quick, sudden movements.  ¨ Change positions slowly.  ¨ Sit on the side of your bed before standing.  ¨ Stand up slowly and wait before you start to walk.  · Let your health care provider know if there is a spill on the floor.  · Pay careful attention to the medical equipment, electrical cords, and tubes around you.  · When you need help, use your call button by your bed or in the bathroom. Wait for one of your health care providers to help you.  · If you feel dizzy or unsure of your footing, return to bed and wait for assistance.  · Avoid being distracted by the TV, telephone, or another person in your room.  · Do not lean or support yourself on rolling objects, such as IV poles or bedside tables.     This information is not intended to replace advice given to you by your health care provider. Make sure you discuss any questions you have with your health care provider.     Document Released: 12/15/2001 Document Revised: 01/08/2016 Document Reviewed: 08/25/2013  [employer] Interactive Patient Education ©2016 [employer]       Surgical Site Infections FAQs  What is a Surgical Site Infection (SSI)?  A surgical site infection is an  infection that occurs after surgery in the part of the body where the surgery took place. Most patients who have surgery do not develop an infection. However, infections develop in about 1 to 3 out of every 100 patients who have surgery.  Some of the common symptoms of a surgical site infection are:  · Redness and pain around the area where you had surgery  · Drainage of cloudy fluid from your surgical wound  · Fever  Can SSIs be treated?  Yes. Most surgical site infections can be treated with antibiotics. The antibiotic given to you depends on the bacteria (germs) causing the infection. Sometimes patients with SSIs also need another surgery to treat the infection.  What are some of the things that hospitals are doing to prevent SSIs?  To prevent SSIs, doctors, nurses, and other healthcare providers:  · Clean their hands and arms up to their elbows with an antiseptic agent just before the surgery.  · Clean their hands with soap and water or an alcohol-based hand rub before and after caring for each patient.  · May remove some of your hair immediately before your surgery using electric clippers if the hair is in the same area where the procedure will occur. They should not shave you with a razor.  · Wear special hair covers, masks, gowns, and gloves during surgery to keep the surgery area clean.  · Give you antibiotics before your surgery starts. In most cases, you should get antibiotics within 60 minutes before the surgery starts and the antibiotics should be stopped within 24 hours after surgery.  · Clean the skin at the site of your surgery with a special soap that kills germs.  What can I do to help prevent SSIs?  Before your surgery:  · Tell your doctor about other medical problems you may have. Health problems such as allergies, diabetes, and obesity could affect your surgery and your treatment.  · Quit smoking. Patients who smoke get more infections. Talk to your doctor about how you can quit before your  surgery.  · Do not shave near where you will have surgery. Shaving with a razor can irritate your skin and make it easier to develop an infection.  At the time of your surgery:  · Speak up if someone tries to shave you with a razor before surgery. Ask why you need to be shaved and talk with your surgeon if you have any concerns.  · Ask if you will get antibiotics before surgery.  After your surgery:  · Make sure that your healthcare providers clean their hands before examining you, either with soap and water or an alcohol-based hand rub.  · If you do not see your providers clean their hands, please ask them to do so.  · Family and friends who visit you should not touch the surgical wound or dressings.  · Family and friends should clean their hands with soap and water or an alcohol-based hand rub before and after visiting you. If you do not see them clean their hands, ask them to clean their hands.  What do I need to do when I go home from the hospital?  · Before you go home, your doctor or nurse should explain everything you need to know about taking care of your wound. Make sure you understand how to care for your wound before you leave the hospital.  · Always clean your hands before and after caring for your wound.  · Before you go home, make sure you know who to contact if you have questions or problems after you get home.  · If you have any symptoms of an infection, such as redness and pain at the surgery site, drainage, or fever, call your doctor immediately.  If you have additional questions, please ask your doctor or nurse.  Developed and co-sponsored by The Society for Healthcare Epidemiology of [place] (SHEA); Infectious Diseases Society of [place] (IDSA); American Hospital Association; Association for Professionals in Infection Control and Epidemiology (APIC); Centers for Disease Control and Prevention (CDC); and The Joint Commission.     This information is not intended to replace advice given to you by  your health care provider. Make sure you discuss any questions you have with your health care provider.     Document Released: 12/23/2014 Document Revised: 01/08/2016 Document Reviewed: 03/02/2016  [employer] Interactive Patient Education ©2016 [employer]       [facility]  CHG 4% Patient Instruction Sheet    Preparing the Skin Before Surgery  Preparing or “prepping” skin before surgery can reduce the risk of infection at the surgical site. To make the process easier,[facility] has chosen 4% Chlorhexidine Gluconate (CHG) antiseptic solution.   The steps below outline the prepping process and should be carefully followed.                                                                                                                                                      Prep the skin at the following time(s):                                                      We recommend you shower the night before surgery, and again the morning of surgery with the 4% CHG antiseptic solution using half of the bottle and a cloth each time.  Dress in clean clothes/sleepwear after showering.  See instructions below for application.          Do not apply any lotions or moisturizers.       Do not shave the area to be prepped for at least 2 days prior to surgery.    Clipping the hair may be done immediately prior to your surgery at the hospital    if needed.    Directions:  Thoroughly rinse your body with water.  Apply 4% CHG to a cloth and wash skin gently, paying special attention to the operative site.  Rinse again thoroughly.  Once you have begun using this product do not apply anything else to your skin. If itching or redness persists, rinse affected areas and discontinue use.    When using this product:  • Keep out of eyes, ears, and mouth.  • If solution should contact these areas, rinse out promptly and thoroughly with water.  • For external use only.  • Do not use in genital area, on your face or head.

## 2018-05-04 ENCOUNTER — ANESTHESIA EVENT (OUTPATIENT)
Dept: PERIOP | Facility: HOSPITAL | Age: 73
End: 2018-05-04

## 2018-05-04 ENCOUNTER — ANESTHESIA (OUTPATIENT)
Dept: PERIOP | Facility: HOSPITAL | Age: 73
End: 2018-05-04

## 2018-05-04 ENCOUNTER — HOSPITAL ENCOUNTER (OUTPATIENT)
Facility: HOSPITAL | Age: 73
Discharge: HOME | End: 2018-05-05
Attending: SPECIALIST | Admitting: SPECIALIST

## 2018-05-04 DIAGNOSIS — K37 APPENDICITIS: ICD-10-CM

## 2018-05-04 PROCEDURE — 94799 UNLISTED PULMONARY SVC/PX: CPT

## 2018-05-04 PROCEDURE — G0378 HOSPITAL OBSERVATION PER HR: HCPCS

## 2018-05-04 PROCEDURE — 25010000002 PIPERACILLIN SOD-TAZOBACTAM PER 1 G: Performed by: SPECIALIST

## 2018-05-04 PROCEDURE — 88304 TISSUE EXAM BY PATHOLOGIST: CPT | Performed by: SPECIALIST

## 2018-05-04 PROCEDURE — 25010000002 MIDAZOLAM PER 1 MG: Performed by: ANESTHESIOLOGY

## 2018-05-04 PROCEDURE — 25010000002 DEXAMETHASONE PER 1 MG: Performed by: ANESTHESIOLOGY

## 2018-05-04 PROCEDURE — 63710000001 HYDROCODONE-ACETAMINOPHEN 5-325 MG TABLET: Performed by: SPECIALIST

## 2018-05-04 PROCEDURE — A9270 NON-COVERED ITEM OR SERVICE: HCPCS | Performed by: SPECIALIST

## 2018-05-04 PROCEDURE — 25010000002 DEXAMETHASONE PER 1 MG: Performed by: NURSE ANESTHETIST, CERTIFIED REGISTERED

## 2018-05-04 PROCEDURE — 25010000002 PROPOFOL 10 MG/ML EMULSION: Performed by: NURSE ANESTHETIST, CERTIFIED REGISTERED

## 2018-05-04 PROCEDURE — 25010000002 FENTANYL CITRATE (PF) 250 MCG/5ML SOLUTION: Performed by: NURSE ANESTHETIST, CERTIFIED REGISTERED

## 2018-05-04 PROCEDURE — 25010000002 MORPHINE PER 10 MG: Performed by: NURSE ANESTHETIST, CERTIFIED REGISTERED

## 2018-05-04 PROCEDURE — 25010000002 ONDANSETRON PER 1 MG: Performed by: NURSE ANESTHETIST, CERTIFIED REGISTERED

## 2018-05-04 RX ORDER — PROPOFOL 10 MG/ML
VIAL (ML) INTRAVENOUS AS NEEDED
Status: DISCONTINUED | OUTPATIENT
Start: 2018-05-04 | End: 2018-05-04 | Stop reason: SURG

## 2018-05-04 RX ORDER — SODIUM CHLORIDE 0.9 % (FLUSH) 0.9 %
1-10 SYRINGE (ML) INJECTION AS NEEDED
Status: DISCONTINUED | OUTPATIENT
Start: 2018-05-04 | End: 2018-05-04 | Stop reason: HOSPADM

## 2018-05-04 RX ORDER — FENTANYL CITRATE 50 UG/ML
INJECTION, SOLUTION INTRAMUSCULAR; INTRAVENOUS AS NEEDED
Status: DISCONTINUED | OUTPATIENT
Start: 2018-05-04 | End: 2018-05-04 | Stop reason: SURG

## 2018-05-04 RX ORDER — OXYCODONE AND ACETAMINOPHEN 7.5; 325 MG/1; MG/1
2 TABLET ORAL ONCE AS NEEDED
Status: DISCONTINUED | OUTPATIENT
Start: 2018-05-04 | End: 2018-05-04 | Stop reason: HOSPADM

## 2018-05-04 RX ORDER — SODIUM CHLORIDE, SODIUM LACTATE, POTASSIUM CHLORIDE, CALCIUM CHLORIDE 600; 310; 30; 20 MG/100ML; MG/100ML; MG/100ML; MG/100ML
1000 INJECTION, SOLUTION INTRAVENOUS CONTINUOUS
Status: DISCONTINUED | OUTPATIENT
Start: 2018-05-04 | End: 2018-05-04

## 2018-05-04 RX ORDER — MIDAZOLAM HYDROCHLORIDE 1 MG/ML
1 INJECTION INTRAMUSCULAR; INTRAVENOUS
Status: DISCONTINUED | OUTPATIENT
Start: 2018-05-04 | End: 2018-05-04 | Stop reason: HOSPADM

## 2018-05-04 RX ORDER — IPRATROPIUM BROMIDE AND ALBUTEROL SULFATE 2.5; .5 MG/3ML; MG/3ML
3 SOLUTION RESPIRATORY (INHALATION) ONCE AS NEEDED
Status: DISCONTINUED | OUTPATIENT
Start: 2018-05-04 | End: 2018-05-04 | Stop reason: HOSPADM

## 2018-05-04 RX ORDER — OXYCODONE AND ACETAMINOPHEN 10; 325 MG/1; MG/1
1 TABLET ORAL ONCE AS NEEDED
Status: DISCONTINUED | OUTPATIENT
Start: 2018-05-04 | End: 2018-05-04 | Stop reason: HOSPADM

## 2018-05-04 RX ORDER — LIDOCAINE HYDROCHLORIDE 20 MG/ML
INJECTION, SOLUTION INFILTRATION; PERINEURAL AS NEEDED
Status: DISCONTINUED | OUTPATIENT
Start: 2018-05-04 | End: 2018-05-04 | Stop reason: SURG

## 2018-05-04 RX ORDER — LIDOCAINE HYDROCHLORIDE 40 MG/ML
SOLUTION TOPICAL AS NEEDED
Status: DISCONTINUED | OUTPATIENT
Start: 2018-05-04 | End: 2018-05-04 | Stop reason: SURG

## 2018-05-04 RX ORDER — MIDAZOLAM HYDROCHLORIDE 1 MG/ML
2 INJECTION INTRAMUSCULAR; INTRAVENOUS
Status: DISCONTINUED | OUTPATIENT
Start: 2018-05-04 | End: 2018-05-04 | Stop reason: HOSPADM

## 2018-05-04 RX ORDER — NALOXONE HCL 0.4 MG/ML
0.4 VIAL (ML) INJECTION AS NEEDED
Status: DISCONTINUED | OUTPATIENT
Start: 2018-05-04 | End: 2018-05-04 | Stop reason: HOSPADM

## 2018-05-04 RX ORDER — ONDANSETRON 2 MG/ML
INJECTION INTRAMUSCULAR; INTRAVENOUS AS NEEDED
Status: DISCONTINUED | OUTPATIENT
Start: 2018-05-04 | End: 2018-05-04 | Stop reason: SURG

## 2018-05-04 RX ORDER — MORPHINE SULFATE 4 MG/ML
4 INJECTION, SOLUTION INTRAMUSCULAR; INTRAVENOUS EVERY 4 HOURS PRN
Status: DISCONTINUED | OUTPATIENT
Start: 2018-05-04 | End: 2018-05-05 | Stop reason: HOSPADM

## 2018-05-04 RX ORDER — SODIUM CHLORIDE, SODIUM LACTATE, POTASSIUM CHLORIDE, CALCIUM CHLORIDE 600; 310; 30; 20 MG/100ML; MG/100ML; MG/100ML; MG/100ML
100 INJECTION, SOLUTION INTRAVENOUS CONTINUOUS
Status: DISCONTINUED | OUTPATIENT
Start: 2018-05-04 | End: 2018-05-04

## 2018-05-04 RX ORDER — ROCURONIUM BROMIDE 10 MG/ML
INJECTION, SOLUTION INTRAVENOUS AS NEEDED
Status: DISCONTINUED | OUTPATIENT
Start: 2018-05-04 | End: 2018-05-04 | Stop reason: SURG

## 2018-05-04 RX ORDER — SODIUM CHLORIDE 9 MG/ML
INJECTION, SOLUTION INTRAVENOUS AS NEEDED
Status: DISCONTINUED | OUTPATIENT
Start: 2018-05-04 | End: 2018-05-04 | Stop reason: HOSPADM

## 2018-05-04 RX ORDER — DEXAMETHASONE SODIUM PHOSPHATE 4 MG/ML
4 INJECTION, SOLUTION INTRA-ARTICULAR; INTRALESIONAL; INTRAMUSCULAR; INTRAVENOUS; SOFT TISSUE ONCE AS NEEDED
Status: COMPLETED | OUTPATIENT
Start: 2018-05-04 | End: 2018-05-04

## 2018-05-04 RX ORDER — MORPHINE SULFATE 10 MG/ML
INJECTION INTRAMUSCULAR; INTRAVENOUS; SUBCUTANEOUS AS NEEDED
Status: DISCONTINUED | OUTPATIENT
Start: 2018-05-04 | End: 2018-05-04 | Stop reason: SURG

## 2018-05-04 RX ORDER — HYDROCODONE BITARTRATE AND ACETAMINOPHEN 5; 325 MG/1; MG/1
1 TABLET ORAL EVERY 4 HOURS PRN
Status: DISCONTINUED | OUTPATIENT
Start: 2018-05-04 | End: 2018-05-05 | Stop reason: HOSPADM

## 2018-05-04 RX ORDER — MAGNESIUM HYDROXIDE 1200 MG/15ML
LIQUID ORAL AS NEEDED
Status: DISCONTINUED | OUTPATIENT
Start: 2018-05-04 | End: 2018-05-04 | Stop reason: HOSPADM

## 2018-05-04 RX ORDER — LABETALOL HYDROCHLORIDE 5 MG/ML
5 INJECTION, SOLUTION INTRAVENOUS
Status: DISCONTINUED | OUTPATIENT
Start: 2018-05-04 | End: 2018-05-04 | Stop reason: HOSPADM

## 2018-05-04 RX ORDER — DEXAMETHASONE SODIUM PHOSPHATE 4 MG/ML
INJECTION, SOLUTION INTRA-ARTICULAR; INTRALESIONAL; INTRAMUSCULAR; INTRAVENOUS; SOFT TISSUE AS NEEDED
Status: DISCONTINUED | OUTPATIENT
Start: 2018-05-04 | End: 2018-05-04 | Stop reason: SURG

## 2018-05-04 RX ORDER — METOCLOPRAMIDE HYDROCHLORIDE 5 MG/ML
5 INJECTION INTRAMUSCULAR; INTRAVENOUS
Status: DISCONTINUED | OUTPATIENT
Start: 2018-05-04 | End: 2018-05-04 | Stop reason: HOSPADM

## 2018-05-04 RX ORDER — SODIUM CHLORIDE 0.9 % (FLUSH) 0.9 %
3 SYRINGE (ML) INJECTION AS NEEDED
Status: DISCONTINUED | OUTPATIENT
Start: 2018-05-04 | End: 2018-05-04 | Stop reason: HOSPADM

## 2018-05-04 RX ORDER — ACETAMINOPHEN 500 MG
1000 TABLET ORAL ONCE
Status: COMPLETED | OUTPATIENT
Start: 2018-05-04 | End: 2018-05-04

## 2018-05-04 RX ORDER — ONDANSETRON 2 MG/ML
4 INJECTION INTRAMUSCULAR; INTRAVENOUS EVERY 4 HOURS PRN
Status: DISCONTINUED | OUTPATIENT
Start: 2018-05-04 | End: 2018-05-05 | Stop reason: HOSPADM

## 2018-05-04 RX ORDER — ONDANSETRON 2 MG/ML
4 INJECTION INTRAMUSCULAR; INTRAVENOUS ONCE AS NEEDED
Status: DISCONTINUED | OUTPATIENT
Start: 2018-05-04 | End: 2018-05-04 | Stop reason: HOSPADM

## 2018-05-04 RX ORDER — BUPIVACAINE HYDROCHLORIDE AND EPINEPHRINE 2.5; 5 MG/ML; UG/ML
INJECTION, SOLUTION INFILTRATION; PERINEURAL AS NEEDED
Status: DISCONTINUED | OUTPATIENT
Start: 2018-05-04 | End: 2018-05-04 | Stop reason: HOSPADM

## 2018-05-04 RX ORDER — SODIUM CHLORIDE, SODIUM LACTATE, POTASSIUM CHLORIDE, CALCIUM CHLORIDE 600; 310; 30; 20 MG/100ML; MG/100ML; MG/100ML; MG/100ML
100 INJECTION, SOLUTION INTRAVENOUS CONTINUOUS
Status: DISCONTINUED | OUTPATIENT
Start: 2018-05-04 | End: 2018-05-05 | Stop reason: HOSPADM

## 2018-05-04 RX ORDER — FENTANYL CITRATE 50 UG/ML
25 INJECTION, SOLUTION INTRAMUSCULAR; INTRAVENOUS AS NEEDED
Status: DISCONTINUED | OUTPATIENT
Start: 2018-05-04 | End: 2018-05-04 | Stop reason: HOSPADM

## 2018-05-04 RX ORDER — PHENYLEPHRINE HCL IN 0.9% NACL 0.8MG/10ML
SYRINGE (ML) INTRAVENOUS AS NEEDED
Status: DISCONTINUED | OUTPATIENT
Start: 2018-05-04 | End: 2018-05-04 | Stop reason: SURG

## 2018-05-04 RX ORDER — IBUPROFEN 600 MG/1
600 TABLET ORAL ONCE AS NEEDED
Status: DISCONTINUED | OUTPATIENT
Start: 2018-05-04 | End: 2018-05-04 | Stop reason: HOSPADM

## 2018-05-04 RX ORDER — MEPERIDINE HYDROCHLORIDE 25 MG/ML
12.5 INJECTION INTRAMUSCULAR; INTRAVENOUS; SUBCUTANEOUS
Status: DISCONTINUED | OUTPATIENT
Start: 2018-05-04 | End: 2018-05-04 | Stop reason: HOSPADM

## 2018-05-04 RX ADMIN — Medication 160 MCG: at 13:42

## 2018-05-04 RX ADMIN — HYDROCODONE BITARTRATE AND ACETAMINOPHEN 1 TABLET: 5; 325 TABLET ORAL at 17:37

## 2018-05-04 RX ADMIN — LIDOCAINE HYDROCHLORIDE 1 EACH: 40 SOLUTION TOPICAL at 13:36

## 2018-05-04 RX ADMIN — ROCURONIUM BROMIDE 20 MG: 10 INJECTION INTRAVENOUS at 13:36

## 2018-05-04 RX ADMIN — ACETAMINOPHEN 1000 MG: 500 TABLET, FILM COATED ORAL at 12:55

## 2018-05-04 RX ADMIN — TAZOBACTAM SODIUM AND PIPERACILLIN SODIUM 3.38 G: 375; 3 INJECTION, SOLUTION INTRAVENOUS at 17:00

## 2018-05-04 RX ADMIN — SODIUM CHLORIDE, POTASSIUM CHLORIDE, SODIUM LACTATE AND CALCIUM CHLORIDE 1000 ML: 600; 310; 30; 20 INJECTION, SOLUTION INTRAVENOUS at 10:34

## 2018-05-04 RX ADMIN — MORPHINE SULFATE 10 MG: 10 INJECTION, SOLUTION INTRAMUSCULAR; INTRAVENOUS at 14:20

## 2018-05-04 RX ADMIN — ONDANSETRON HYDROCHLORIDE 4 MG: 2 SOLUTION INTRAMUSCULAR; INTRAVENOUS at 14:17

## 2018-05-04 RX ADMIN — DEXAMETHASONE SODIUM PHOSPHATE 4 MG: 4 INJECTION, SOLUTION INTRAMUSCULAR; INTRAVENOUS at 14:17

## 2018-05-04 RX ADMIN — DEXAMETHASONE SODIUM PHOSPHATE 4 MG: 4 INJECTION, SOLUTION INTRA-ARTICULAR; INTRALESIONAL; INTRAMUSCULAR; INTRAVENOUS; SOFT TISSUE at 12:55

## 2018-05-04 RX ADMIN — MIDAZOLAM HYDROCHLORIDE 2 MG: 1 INJECTION, SOLUTION INTRAMUSCULAR; INTRAVENOUS at 12:55

## 2018-05-04 RX ADMIN — PROPOFOL 150 MG: 10 INJECTION, EMULSION INTRAVENOUS at 13:36

## 2018-05-04 RX ADMIN — LIDOCAINE HYDROCHLORIDE 50 MG: 20 INJECTION, SOLUTION INFILTRATION; PERINEURAL at 13:36

## 2018-05-04 RX ADMIN — FENTANYL CITRATE 100 MCG: 50 INJECTION INTRAMUSCULAR; INTRAVENOUS at 13:36

## 2018-05-04 RX ADMIN — SODIUM CHLORIDE, POTASSIUM CHLORIDE, SODIUM LACTATE AND CALCIUM CHLORIDE 100 ML/HR: 600; 310; 30; 20 INJECTION, SOLUTION INTRAVENOUS at 21:49

## 2018-05-04 RX ADMIN — LIDOCAINE HYDROCHLORIDE 0.5 ML: 10 INJECTION, SOLUTION EPIDURAL; INFILTRATION; INTRACAUDAL; PERINEURAL at 10:33

## 2018-05-04 RX ADMIN — FENTANYL CITRATE 150 MCG: 50 INJECTION INTRAMUSCULAR; INTRAVENOUS at 13:58

## 2018-05-04 RX ADMIN — Medication 1 G: at 13:36

## 2018-05-04 RX ADMIN — TAZOBACTAM SODIUM AND PIPERACILLIN SODIUM 3.38 G: 375; 3 INJECTION, SOLUTION INTRAVENOUS at 23:19

## 2018-05-04 RX ADMIN — SUGAMMADEX 149 MG: 100 INJECTION, SOLUTION INTRAVENOUS at 14:27

## 2018-05-04 NOTE — ANESTHESIA PROCEDURE NOTES
Airway  Urgency: elective    Airway not difficult    General Information and Staff    Patient location during procedure: OR    Indications and Patient Condition  Indications for airway management: airway protection    Preoxygenated: yes  MILS maintained throughout  Mask difficulty assessment: 1 - vent by mask    Final Airway Details  Final airway type: endotracheal airway      Successful airway: ETT  Cuffed: yes   Successful intubation technique: direct laryngoscopy  Facilitating devices/methods: intubating stylet  Endotracheal tube insertion site: oral  Blade: Major  Blade size: #2  ETT size: 7.5 mm  Cormack-Lehane Classification: grade I - full view of glottis  Placement verified by: chest auscultation, capnometry and palpation of cuff   Cuff volume (mL): 10  Measured from: lips  ETT to lips (cm): 20  Number of attempts at approach: 1

## 2018-05-04 NOTE — ANESTHESIA PREPROCEDURE EVALUATION
Anesthesia Evaluation     Patient summary reviewed   no history of anesthetic complications:  NPO Solid Status: > 8 hours  NPO Liquid Status: > 8 hours           Airway   Mallampati: I  TM distance: >3 FB  Neck ROM: full  No difficulty expected  Dental - normal exam     Pulmonary - normal exam   (+) sleep apnea on CPAP,   (-) asthma, not a smoker  Cardiovascular - normal exam  Exercise tolerance: good (4-7 METS)    ECG reviewed    (+) hypertension, hyperlipidemia,       Neuro/Psych  (-) seizures, TIA, CVA  GI/Hepatic/Renal/Endo    (-) liver disease, no renal disease, diabetes    Musculoskeletal     Abdominal    Substance History      OB/GYN          Other        ROS/Med Hx Other: Appendicitis                  Anesthesia Plan    ASA 2     general     intravenous induction   Anesthetic plan and risks discussed with patient.

## 2018-05-04 NOTE — ANESTHESIA POSTPROCEDURE EVALUATION
"Patient: Arnie Ross    Procedure Summary     Date:  05/04/18 Room / Location:   [facility] OR 09 /  [facility]    Anesthesia Start:  1329 Anesthesia Stop:  1443    Procedure:  APPENDECTOMY LAPAROSCOPIC (N/A Abdomen) Diagnosis:  (APPENDICITIS)    Surgeon:  Jackie Grewal MD Provider:  Vanda Hanley CRNA    Anesthesia Type:  general ASA Status:  2          Anesthesia Type: general  Last vitals  BP   130/72 (05/04/18 1621)   Temp   97.3 °F (36.3 °C) (05/04/18 1621)   Pulse   58 (05/04/18 1621)   Resp   16 (05/04/18 1621)     SpO2   92 % (05/04/18 1621)     Post Anesthesia Care and Evaluation    Patient location during evaluation: PACU  Patient participation: complete - patient participated  Level of consciousness: awake and alert  Pain management: adequate  Airway patency: patent  Anesthetic complications: No anesthetic complications  PONV Status: controlled  Cardiovascular status: acceptable and hemodynamically stable  Respiratory status: acceptable  Hydration status: acceptable    Comments: Patient discharged from PACU prior to anesthesia evaluation based on Amisha Score.  For details, see RN note.     /72 (BP Location: Left arm, Patient Position: Lying)   Pulse 58   Temp 97.3 °F (36.3 °C) (Oral)   Resp 16   Ht 161 cm (63.39\")   Wt 74.4 kg (164 lb 0.4 oz)   SpO2 92%   BMI 28.70 kg/m²       "

## 2018-05-04 NOTE — H&P
Jackie Grewal MD [facility] History and Physical     Referring Provider: Jackie Grewal MD    Patient Care Team:  Nando Pope MD as PCP - General (Family Medicine)    Chief complaint appendicitis    Subjective .     History of present illness:  The patient is a 73 y.o. female who has been treated for acute appendicitis with abscess nonoperatively.  She presents for interval appendectomy.    Review of Systems    Review of Systems - General ROS: negative  ENT ROS: negative  Respiratory ROS: no cough, shortness of breath, or wheezing  Cardiovascular ROS: no chest pain or dyspnea on exertion  Gastrointestinal ROS: no abdominal pain, change in bowel habits, or black or bloody stools  Genito-Urinary ROS: no dysuria, trouble voiding, or hematuria  Dermatological ROS: negative   Breast ROS: negative for breast lumps  Hematological and Lymphatic ROS: negative  Musculoskeletal ROS: negative   Neurological ROS: no TIA or stroke symptoms    Psychological ROS: negative  Endocrine ROS: negative    History  Past Medical History:   Diagnosis Date   • Hard of hearing    • Hyperlipidemia    • Hypertension    • Sleep apnea with use of continuous positive airway pressure (CPAP)    ,   Past Surgical History:   Procedure Laterality Date   • CATARACT EXTRACTION     • D&C CERVICAL BIOPSY      X 2   • GALLBLADDER SURGERY  2011   ,   Family History   Problem Relation Age of Onset   • Cancer Sister    • Diabetes Sister    • Cancer Brother    ,   Social History   Substance Use Topics   • Smoking status: Never Smoker   • Smokeless tobacco: Never Used   • Alcohol use No   ,   No prescriptions prior to admission.    and Allergies:  Augmentin [amoxicillin-pot clavulanate]; Codeine; and Sulfa antibiotics  No current facility-administered medications for this encounter.     Current Outpatient Prescriptions:   •  aspirin 81 MG EC tablet, Take 81 mg by mouth Every Night., Disp: , Rfl:   •  calcium carbonate (OS-SLIM) 600 MG tablet, Take 600 mg by  mouth Daily., Disp: , Rfl:   •  cholecalciferol (VITAMIN D3) 1000 units tablet, Take 2,000 Units by mouth Daily., Disp: , Rfl:   •  fenofibrate 160 MG tablet, Take 160 mg by mouth Daily., Disp: , Rfl:   •  fexofenadine-pseudoephedrine (ALLEGRA-D)  MG per 12 hr tablet, Take 1 tablet by mouth Every 12 (Twelve) Hours As Needed for Allergies., Disp: , Rfl:   •  naproxen (NAPROSYN) 500 MG tablet, Take 500 mg by mouth 2 (Two) Times a Day As Needed for Moderate Pain ., Disp: , Rfl:   •  Omega-3 Fatty Acids (FISH OIL) 1000 MG capsule capsule, Take 1,000 mg by mouth Daily With Breakfast., Disp: , Rfl:   •  potassium chloride (K-DUR,KLOR-CON) 10 MEQ CR tablet, Take 10 mEq by mouth Daily., Disp: , Rfl:   •  Prenatal MV-Min-Fe Fum-FA-DHA (PRENATAL+DHA PO), Take 1 tablet by mouth Daily., Disp: , Rfl:   •  valsartan-hydrochlorothiazide (DIOVAN-HCT) 320-25 MG per tablet, Take 1 tablet by mouth Daily., Disp: , Rfl:   •  vitamin E 400 UNIT capsule, Take 400 Units by mouth Daily., Disp: , Rfl:     Objective     Vital Signs   Heart Rate:  [52] 52  BP: (130)/(63) 130/63    Physical Exam:  General appearance - alert, well appearing, and in no distress  Mental status - alert, oriented to person, place, and time  Neck - supple, no significant adenopathy  Chest - clear to auscultation, no wheezes, rales or rhonchi, symmetric air entry  Heart - normal rate, regular rhythm, normal S1, S2, no murmurs, rubs, clicks or gallops  Abdomen - soft, nontender, nondistended, no masses or organomegaly  Neurological - alert, oriented, normal speech, no focal findings or movement disorder noted  Musculoskeletal - no joint tenderness, deformity or swelling  Extremities - peripheral pulses normal, no pedal edema, no clubbing or cyanosis    Results Review:     Lab Results (last 24 hours)     ** No results found for the last 24 hours. **        Imaging Results (last 24 hours)     ** No results found for the last 24 hours. **            Assessment/Plan        Acute on chronic appendicitis.  She will undergo laparoscopic appendectomy with possible need for conversion to open.  The risks, benefits, complications, and possible alternatives were discussed with the patient who agreed to proceed.      Jackie Grewal MD  05/04/18  6:05 AM

## 2018-05-04 NOTE — PLAN OF CARE
Problem: Patient Care Overview  Goal: Plan of Care Review  Outcome: Ongoing (interventions implemented as appropriate)   05/04/18 0360   Coping/Psychosocial   Plan of Care Reviewed With patient   Plan of Care Review   Progress improving   OTHER   Outcome Summary PT. HAS BEEN UP TO BATHROOM SINCE SURGERY. VOIDING. TOLERATING REGULAR DIET OK. DRSGS. DRY AND INTACT. NO DISTRESS NOTED.     Goal: Discharge Needs Assessment  Outcome: Ongoing (interventions implemented as appropriate)

## 2018-05-05 VITALS
WEIGHT: 164.02 LBS | HEIGHT: 63 IN | OXYGEN SATURATION: 97 % | RESPIRATION RATE: 16 BRPM | BODY MASS INDEX: 29.06 KG/M2 | DIASTOLIC BLOOD PRESSURE: 63 MMHG | TEMPERATURE: 97.9 F | HEART RATE: 58 BPM | SYSTOLIC BLOOD PRESSURE: 118 MMHG

## 2018-05-05 PROCEDURE — A9270 NON-COVERED ITEM OR SERVICE: HCPCS | Performed by: SPECIALIST

## 2018-05-05 PROCEDURE — 63710000001 HYDROCODONE-ACETAMINOPHEN 5-325 MG TABLET: Performed by: SPECIALIST

## 2018-05-05 PROCEDURE — G0378 HOSPITAL OBSERVATION PER HR: HCPCS

## 2018-05-05 PROCEDURE — 25010000002 ENOXAPARIN PER 10 MG: Performed by: SPECIALIST

## 2018-05-05 PROCEDURE — 25010000002 PIPERACILLIN SOD-TAZOBACTAM PER 1 G: Performed by: SPECIALIST

## 2018-05-05 RX ORDER — HYDROCODONE BITARTRATE AND ACETAMINOPHEN 5; 325 MG/1; MG/1
1 TABLET ORAL ONCE AS NEEDED
Status: DISCONTINUED | OUTPATIENT
Start: 2018-05-05 | End: 2018-05-05 | Stop reason: HOSPADM

## 2018-05-05 RX ORDER — HYDROCODONE BITARTRATE AND ACETAMINOPHEN 7.5; 325 MG/1; MG/1
1 TABLET ORAL EVERY 4 HOURS PRN
Qty: 30 TABLET | Refills: 0 | Status: SHIPPED | OUTPATIENT
Start: 2018-05-05 | End: 2020-12-21

## 2018-05-05 RX ADMIN — HYDROCODONE BITARTRATE AND ACETAMINOPHEN 1 TABLET: 5; 325 TABLET ORAL at 05:09

## 2018-05-05 RX ADMIN — TAZOBACTAM SODIUM AND PIPERACILLIN SODIUM 3.38 G: 375; 3 INJECTION, SOLUTION INTRAVENOUS at 10:03

## 2018-05-05 RX ADMIN — ENOXAPARIN SODIUM 40 MG: 40 INJECTION SUBCUTANEOUS at 08:42

## 2018-05-05 RX ADMIN — TAZOBACTAM SODIUM AND PIPERACILLIN SODIUM 3.38 G: 375; 3 INJECTION, SOLUTION INTRAVENOUS at 05:44

## 2018-05-05 NOTE — OP NOTE
Preoperative diagnosis:  Appendicitis  Postoperative diagnosis: same  Procedure:  Interval laparoscopic appendectomy  Surgeon:  Jackie Grewal MD  Anesthesia:  Get loc  Ebl:  Minimal  Ivf:  See anesthesia notes  Indications:  The patient has a history of acute appendicitis with associated phlegmon.  She was treated nonoperatively.  She now presents for interval appendectomy. The risks, benefits, complications, and possible alternatives were discussed with the patient who agreed to proceed.  Description of procedure:  The patient was laid supine.  The abdomen was prepped and draped.  The abdomen was entered with veress.  Trocars were placed.  The base of the cecum was noninflamed.  An indurated nonperforated appendix was retrocecal.  Blunt dissection was used to free it from the retroperitoneal structures.  An opening was created thru the mesoappendix and an endogia 35mm blue stapler was used to transect the appendix.  An endogia 35mm white stapler was used to transect the mesoappendix.  It was placed in an endocatch bag and removed.  The fascia of the left lower quadrant port site was closed with 0 vicryl using an endostitch applier.  The skin was closed with 3-0 and 4-0 vicryl.  The sponge, needle, and instrument counts were correct.  Complications:  None  Disposition:  Good to pacu  Findings:  Inflammatory changes at the right lower quadrant.  Indurated and inflamed but nonperforated appendix

## 2018-05-05 NOTE — DISCHARGE SUMMARY
Idalia Roberts MD Discharge Summary    Date of Admission: 5/4/2018  Date of Discharge:  5/5/2018    Discharge Diagnosis: Acute on chronic appendicitis    Presenting Problem/History of Present Illness    History and Physical as outlined in the chart    Hospital Course  Patient was admitted after undergoing the above operation.  Hospital course postoperatively was uneventful.  Patient will be discharged to home.  See medication reconciliation for medications at discharge.    Procedures Performed  Procedure(s):  APPENDECTOMY LAPAROSCOPIC       Consults:   Consults     No orders found for last 30 day(s).          Condition on Discharge:  stable      Discharge Disposition  Home or Self Care    Discharge Medications   Arnie Ross   Home Medication Instructions MABEL:[id number]    Printed on:05/05/18 9154   Medication Information                      aspirin 81 MG EC tablet  Take 81 mg by mouth Every Night.             calcium carbonate (OS-SLIM) 600 MG tablet  Take 600 mg by mouth Daily.             cholecalciferol (VITAMIN D3) 1000 units tablet  Take 2,000 Units by mouth Daily.             fenofibrate 160 MG tablet  Take 160 mg by mouth Daily.             fexofenadine-pseudoephedrine (ALLEGRA-D)  MG per 12 hr tablet  Take 1 tablet by mouth Every 12 (Twelve) Hours As Needed for Allergies.             HYDROcodone-acetaminophen (NORCO) 7.5-325 MG per tablet  Take 1 tablet by mouth Every 4 (Four) Hours As Needed for Moderate Pain  (Pain).             naproxen (NAPROSYN) 500 MG tablet  Take 500 mg by mouth 2 (Two) Times a Day As Needed for Moderate Pain .             Omega-3 Fatty Acids (FISH OIL) 1000 MG capsule capsule  Take 1,000 mg by mouth Daily With Breakfast.             potassium chloride (K-DUR,KLOR-CON) 10 MEQ CR tablet  Take 10 mEq by mouth Daily.             Prenatal MV-Min-Fe Fum-FA-DHA (PRENATAL+DHA PO)  Take 1 tablet by mouth Daily.             valsartan-hydrochlorothiazide (DIOVAN-HCT) 320-25 MG per  tablet  Take 1 tablet by mouth Daily.             vitamin E 400 UNIT capsule  Take 400 Units by mouth Daily.                 Discharge Diet:     Activity at Discharge:   Activity Instructions     Discharge Activity       No driving while on pain medications  No heavy lifting or straining for one week          Follow-up Appointments  No future appointments.  Additional Instructions for the Follow-ups that You Need to Schedule     Discharge Follow-up with Specified Provider: duek; 2 Weeks    As directed      To:  duke    Follow Up:  2 Weeks               Test Results Pending at Discharge   Order Current Status    Tissue Pathology Exam Collected (05/04/18 1951)           Idalia Roberts MD  05/05/18  9:20 AM    Time: Time spent at discharge 30 minutes

## 2018-05-09 LAB
CYTO UR: NORMAL
LAB AP CASE REPORT: NORMAL
Lab: NORMAL
PATH REPORT.FINAL DX SPEC: NORMAL
PATH REPORT.GROSS SPEC: NORMAL

## 2020-02-12 ENCOUNTER — OFFICE VISIT (OUTPATIENT)
Dept: OTOLARYNGOLOGY | Facility: CLINIC | Age: 75
End: 2020-02-12

## 2020-02-12 VITALS
WEIGHT: 163.6 LBS | DIASTOLIC BLOOD PRESSURE: 82 MMHG | TEMPERATURE: 97.4 F | HEART RATE: 81 BPM | HEIGHT: 63 IN | SYSTOLIC BLOOD PRESSURE: 145 MMHG | BODY MASS INDEX: 28.99 KG/M2

## 2020-02-12 DIAGNOSIS — J34.2 ACQUIRED DEVIATED NASAL SEPTUM: ICD-10-CM

## 2020-02-12 DIAGNOSIS — G47.30 SLEEP APNEA IN ADULT: ICD-10-CM

## 2020-02-12 DIAGNOSIS — K21.9 GERD WITHOUT ESOPHAGITIS: ICD-10-CM

## 2020-02-12 DIAGNOSIS — H90.3 SENSORINEURAL HEARING LOSS (SNHL) OF BOTH EARS: ICD-10-CM

## 2020-02-12 DIAGNOSIS — J31.1 POST-NASAL CATARRH: ICD-10-CM

## 2020-02-12 DIAGNOSIS — R05.9 COUGH: Primary | ICD-10-CM

## 2020-02-12 PROCEDURE — 31575 DIAGNOSTIC LARYNGOSCOPY: CPT | Performed by: OTOLARYNGOLOGY

## 2020-02-12 PROCEDURE — 99204 OFFICE O/P NEW MOD 45 MIN: CPT | Performed by: OTOLARYNGOLOGY

## 2020-02-12 RX ORDER — FLUTICASONE PROPIONATE 50 MCG
2 SPRAY, SUSPENSION (ML) NASAL 2 TIMES DAILY
Qty: 48 G | Refills: 3 | Status: SHIPPED | OUTPATIENT
Start: 2020-02-12

## 2020-02-12 RX ORDER — IPRATROPIUM BROMIDE 42 UG/1
2 SPRAY, METERED NASAL 4 TIMES DAILY PRN
Qty: 45 ML | Refills: 3 | Status: SHIPPED | OUTPATIENT
Start: 2020-02-12 | End: 2021-02-22

## 2020-02-12 RX ORDER — BENZONATATE 100 MG/1
100 CAPSULE ORAL 3 TIMES DAILY PRN
COMMUNITY
End: 2020-11-09 | Stop reason: SDUPTHER

## 2020-02-12 RX ORDER — OMEPRAZOLE 40 MG/1
40 CAPSULE, DELAYED RELEASE ORAL NIGHTLY
Qty: 30 CAPSULE | Refills: 3 | Status: SHIPPED | OUTPATIENT
Start: 2020-02-12 | End: 2020-03-13

## 2020-02-12 RX ORDER — SUCRALFATE 1 G/1
1 TABLET ORAL 2 TIMES DAILY
Qty: 60 TABLET | Refills: 3 | Status: SHIPPED | OUTPATIENT
Start: 2020-02-12 | End: 2020-09-22 | Stop reason: SDUPTHER

## 2020-02-12 NOTE — PROGRESS NOTES
Ivelisse Monae LPN   Patient Intake Note    Review of Systems  Review of Systems   Constitutional: Negative for chills, fatigue and fever.   HENT:        See HPI   Respiratory: Positive for cough. Negative for choking and shortness of breath.    Gastrointestinal: Negative for diarrhea, nausea and vomiting.   Neurological: Negative for dizziness, light-headedness and headaches.   Psychiatric/Behavioral: Negative for sleep disturbance.       Tobacco Use: Screening and Cessation Intervention  Social History    Tobacco Use      Smoking status: Never Smoker      Smokeless tobacco: Never Used        Ivelisse Monae LPN  2/12/2020  1:01 PM

## 2020-02-12 NOTE — PROGRESS NOTES
Obinna Ibarra Jr, MD   ENT PROCEDURE NOTE:  Anesthesia: topical 4% tetracaine and oxymetazoline mix    Endoscopy Type: Flexible Laryngoscopy    Indications for Procedure:     Procedure Details:    The patient was placed in an upright position.  The laryngoscope was passed bilateral nasal passge.  The nasal cavities, nasopharynx, oropharynx, hypopharynx, and larynx were all examined.  Vocal cords were examined during respiration and phonation.  The following findings were noted:    Findings:   NASAL SCOPE FINDINGS:    Nasal endoscopy   NASALMUCOSA:    abnormal:        Bilateral- bluish, thick    NASAL PASSAGES:     abnormal   Bilateral- narrowed    NASAL VALVE:     intact, good support and no nasal obstruction   SEPTUM:     mucosa abnormal   Bilateral- bluish, thick     deviation present:      deviated Right mid mild     deviated Left low moderate   INFERIOR TURBINATES:     normal size, non-obstructing, no lesions   MIDDLE TURBINATES:     normal size, non-obstructing, no lesions, middle meatus non-obstructed, no lesions/polyps   MIDDLE MEATUS:      Normal, patent, no mucopus, non-surgical  Bilateral   SPHENO-ETHMOID RECESS:    normal, normal sphenoid ostia. no mucopus, non-surgical   NASOPHARYNX:     Adenoids:  thick, flat     Eustachian tubes:        BILATERAL: bluish, thick   SECRETIONS:     abnormal Bilateral- thick, clear   LARYNGOSCOPY FINDINGS :    OROPHARYNX:     Lateral walls:         Redundant          BILATERAL    Posterior walls:        thick, bluish    Tonsils:         BILATERAL: atrophic    BASEOF TONGUE:          prolapse  moderate    LINGUAL TONSILS:          BILATERAL: thick, wet    VALLECULA:        BILATERAL: mild pooling  EPIGLOTTIS:    Position: normal    Color: bluish    Mucosa: intact    Shape: normal  HYPOPHARYNX:    Lateral walls:         BILATERAL: redundant    Posterior wall:        thick, bluish  ARYEPIGLOTTIC FOLDS:     Abnormal:        BILATERAL: thick  ARYTENOIDS:     Mobility:        Normal   Bilateral    [place] size:         BILATERAL: mildly enlarged  FALSE CORDS:     normal mucosa, no lesions, normal mobility  Bilateral  TRUE VOCAL FOLDS:      normal appearance, mobility, no lesions  Bilateral  GLOTTIS:     normal closure, with good cord apposition  SUBGLOTTIS:     unobstructed, patent, no lesions    Condition:  Stable.  Patient tolerated procedure well.    Complications:  None    Post-procedure instructions reviewed with Patient  Instructions documented in AVS for patient to review

## 2020-02-12 NOTE — PATIENT INSTRUCTIONS
"NASAL SALINE:  Use 2 puffs each nostril 4-6 times daily and more frequently if possible.  You can buy saline spray or you can make your own and use an old spray bottle to administer  Use a humidifier at bedside  Recipe for saline:d  Water                                 1 quart  Salt (table)                        1 tablespoon  Gylcerin (or Aretha Syrup)    1 teaspoon  Sodium bicarbonate           1 teaspoon  Sprays or Roxanne pots are recommended    Nasal steroid use:  Using nasal steroids:  You will be prescribed one of the following nasal steroids: Flonase, Nasacort, Nasonex, Rhinocort, Qnasl, Zetonna  2 puffs each nostril 2 times daily  Start as soon as possible  If you are using Afrin for 3 days with the nasal steroid,  Use Afrin first and wait 10 minutes to allow the nose to open. Then administer nasal steroids.    THE PROBLEM OF ACID REFLUX    In BOTH children and adults, irritating acids may leak from the stomach and come up into the esophagus and throat. This can occur at any time, but occurs more commonly when lying down. When the acid touches the lining of the esophagus, there is irritation and muscle spasm, which can be felt up into the throat. Usually this is felt as \"heartburn\". When scarring of the esophagus occurs, the esophagus becomes less sensitive and the symptoms may only be in the throat.     Some of the symptoms that can occur with acid reflux into the throat include coughing, soreness, burning, hoarseness, throat clearing, excessive mucous, bad taste in the mouth, bad breath, a sensation of a lump in the throat, wheezing, post-nasal drip, choking spells, bleeding and nausea. In a small percentage of people, more serious problems may result, such as pneumonia, ulcers in the larynx (voice box), reduced mobility of the vocal cords and a pouch in the upper esophagus (diverticulum). In children, the symptoms may be different and include persistent vomiting, bleeding from the esophagus, respiratory " problems, pneumonia, asthma, choking spells, swallowing problems and anemia. In some cases, unexplained fussiness and crying is due to reflux.     The following instructions are designed to help neutralize the stomach acid, reduce the production of the acid, promote emptying of the acid from the stomach into the intestines and prevent acid from coming up into the esophagus. You should adopt enough of these changes to alleviate your symptoms. If carrying out these suggestions produces no change, it is imperative that you return so that we can discuss further the problem. More testing may be required, as might medication. It is important to realize that the esophagus takes time to heal, so you should not expect results immediately.    Diet  Most often, the throat symptoms above are due to dietary abuses. Certain foods are known to cause irritation, because they are acids themselves or cause excess production of stomach acid. These should be avoided, if possible. The following is a partial list of foods recognized as troublesome: coffee (even decaffeinated), tea chocolate, cola beverages, citrus beverages (such as 7-Up), caffeine containing beverages, alcohol, citrus fruits and juices, highly spiced foods, fatty foods, candies, nuts, mints, milk and milk products. Some of the worst offenders for promoting stomach acid are milk and beer.     Hard candies, gum, breath fresheners, throat lozenges, cough drops, mouthwashes, gargles, may actually irritate the throat directly (many cough drops and lozenges contain irritants such as oil of eucalyptus and menthol), and can also stimulate acid production directly.     Large amounts of liquid in the diet tend to promote reflux, because liquids tend to come back up more easily than solids.     Meals should be bland and consist of real food, trying to avoid recreational food. Multiple small meals, rather than one or two large meals helps prevent reflux by not stretching the  stomach and increasing the time needed for digestion, as well increasing the amount of acid needed to digest the meal. If you are overweight, losing weight is tremendously helpful.     YOU SHOULD NOT EAT FOR AT LEAST TWO HOURS BEFORE RETIRING AND YOU SHOULD REMAIN SITTING OR STANDING FOR AT LEAST 45 MINUTES AFTER EACH MEAL. This promotes passage of food from the stomach into the intestine.    Posture  Body weight is a significant factor in promoting reflux. Losing weight is beneficial. Pregnancy will markedly increase the symptoms of heartburn and throat pain. You should avoid clothing that fits tightly across the mid-section, as this promotes squeezing of the abdominal contents upward. It is helpful to practice abdominal or diaphragmatic breathing, using the stomach to push out each breath rather than chest expansion. Avoid slumping while sitting, and avoid stooping or straining.     For many, the reflux occurs at night while sleeping. This is the time acid production is the greatest and you are lying down, a position that promotes reflux, thus setting up the irritation that occurs the next morning. One of the most important things you can do is to elevate the head of the bed, in an effort to use gravity to keep the acid in the stomach. This is accomplished by placed blocks or bricks beneath the legs at the head of the bed, raising the head 6-10 inches. Do not make the head so high that you slide down. Pillows are not effective because they cause the body to curl, increasing abdominal pressure and it is difficult to remain upright on them. Additionally, pillows promote sleeping on the back, but it is far more desirable to sleep on the right side or on the stomach, as this allows gas to escape, while preventing the escape of acid material. Children and infants, who are refluxing, should sleep on their stomachs.  Exercise  Regular exercise is extremely beneficial in promoting good digestion and regularity. The  abdominal muscles are toned, which aids in controlling acid problems. However, it is recommended that you not participate in vigorous activity immediately after eating. This causes pressure on an already full stomach, forcing acid up into the esophagus. Regular exercise is encouraged, prior to meals, or two hours after meals.  Antacids  Antacids should be used regularly, as they neutralize excess acid. Gelusil II, Mylanta II, Tums and Rolaids are popular brands. Gaviscon is not an antacid, but floats on top of the stomach acid, preventing esophageal irritation. Care should be used in administering large doses of antacids, especially in children. Many antacid preparations contain magnesium, which promotes frequent bowel movements, while antacids that contain aluminum can be constipating. Tums have a high calcium content that can be used to some advantage in older women with reflux.     Antacid tablets are convenient, but the liquid form tends to work much more quickly. Also remember to check with your physician about interference with other medications. Antacids can slow the absorption of digitalis, Indocin, tetracyclines, fluorides and isoniazid from the intestines. Many medications require the presence of stomach acid to be absorbed.     Initially, antacids should be used 30 minutes after each meal, 2 hours after each meal and at bedtime. This maximizes the neutralization of the stomach acid. Antacids can be used more frequently if symptoms persist, but such extensive use needs to be reviewed with your physician.  Prescription Medications  If the above recommendations are not effectively resolving the symptoms, medications may be prescribed. These are usually in the form of acid production blockers, such as Tagamet, Zantac, Pepcid, or Axid or acid pump inhibitors like Prevacid, Nexium, Protonix or Prilosec. These drugs help stop acid production in the stomach. Medications such as Reglan can be used to promote  stomach emptying.  Medications That Promote Reflux  Certain medications can promote acid reflux; either by relaxing the sphincter muscle that helps keeps stomach acid down, or increasing the acid production. These include progesterone (Provera, Ortho Novum, Ovral, other birth control pills), theophylline (Noe-Dur, etc.), anticholinergic (Donnatal, Scopolamine, Probanthine, Bentil, others), beta blockers (Inderal, Tenormin and Corgard), alpha blockers (Dibenzyline), dopamine, calcium channel blockers (Procardia, Cardizem, Isotin, Calan), aspirin, non-steroidal anti-inflammatory drugs (Motrin, Advil, Nuprin, Nalfon, Rufen, Indocin, Feldene, Clinoril and Tolectin). Vitamin C is an acid and can promote reflux. This is only a partial list and before stopping any prescription medication, you should check with your physician.    Goal  The goal is to reduce the symptoms with the least number of lifestyle changes. A combination of the above suggestions is frequently prescribed to return you to normal as quickly as possible. However, this problem did not develop overnight and will not disappear rapidly. Therefore, developing a regimen will aid in controlling symptoms and keep you symptom free for a long period of time. Other alternatives exist, should these suggestions fail, and can be discussed with your physician.     To Summarize:  Watch your diet, avoid foods that cause acid reflux  Lose weight  Avoid tight fitting clothes  Adjust your posture, raise the head of the bed  Exercise regularly  Use antacids  Use prescription medication as directed  Avoid medications that promote reflux  Avoid behavior and eating habits that promote reflux of stomach acid     You are welcome to do a Google search on the topic of reflux and reflux diets. The internet has many useful resources.     Please remember, your success in controlling this condition depends on many factors including diet, exercise, medications and follow up. All are  important and must be utilized to achieve success.     REFLUX MEDICATION USE:   Do Take:, Omeprazole/Prilosec, Zegerid, nightly  Sulcrafate (Carafate), twice daily    ANTACID USE:  yes  Use antacids 30 mins after every meal, 2 hours after every meal and at Bedtime  Use Gaviscon Extra (best), Tums, Rolaids, etc  Over the counter  Use 2 tsp or 2 tabs as the dose  Remember that some of these products contain Calcium or Aluminum. If you have dietary or medical issues, please inform physician    VOICE HYGIENE:    Many factors go into manufacturing what we call the voice. More goes on than just breathing out and using the voice box and mouth. Creating the voice requires good lung function, a healthy voice box, and complex muscle coordination. Any malfunction in any of the systems and voice problems can occur.    The most common problem seen in the office is hoarseness. Other types of voice problems can include breathiness, double voice, raspy voice, or simply a change in the pitch. Many things can cause a hoarse or weak voice, including laryngitis, vocal abuse (screaming, cheering, singing too loud for too long), smoking, etc. In order to treat the problem, your physician first must find the cause, then try to correct it to restore your normal voice.    The first part of a voice analysis is a detailed history of the problem, including any habits such as smoking, to delineate possible factors. The second step is a careful examination of the head and neck, including the voice box. The examination is essential, as this eliminates any anatomic problems with the vocal cords and the surrounding structures. More detailed analysis may be used in the form of videotaping the vocal cords with a stroboscopic light, thus providing additional information.     Suggestions for Voice Use and Conservation    Treatment of voice problems depends on the cause of the problems. Fortunately, most problems are easily correctable. The following  are a few suggestions you will be asked to carry out, depending on the results of your examination.    >Avoid stress  >Avoid habitually clearing throat as this strains and fatigues the vocal cords  >Avoid yelling, especially for extended periods of time  >Avoid falsetto singing  >Avoid talking when it hurts or if your neck is excessively tired.  >Do exercise regularly to maintain fitness and breathe control. Do eat correctly, to avoid acid reflux  >Drink 6-8 glasses of water daily to keep the vocal cords flexible, making it easier to speak  >Do take sips of water while speaking as swallowing helps relax the muscles of the throat and neck  >Do sip water, sniff or say the letter “H” forcefully rather than clearing throat  >Do listen to your voice as you speak to avoid trailing off or “tightness” at the end of sentences  >Do use plenty of breath while speaking. If you feel you are running out of breath, stop, breathe then continue.  >Do speak easily rather than pushing, forcing or straining  >Do stop talking and take a voice break, especially if you are fading or fatiguing  >Do increase your awareness of your voice. When does it sound better, worse? >Recording your voice or watching yourself speak in a mirror to watch for movement in the neck and shoulders can be beneficial.    Whispering is not a protective mechanism for the voice. In fact, whispering can make your condition worse. In order to limit further damage to your voice, maintain a normal volume and tone. Only in certain circumstances will this recommendation require modification. Dr. Ibarra will tell you if that is the case.    Complete voice rest is used occasionally to treat voice and vocal cord problems. However, this is a rare situation. Dr. Ibarra will tell you if this is indicated.    A great deal of voice hygiene is based on common sense. If it hurts to talk, then a problem exists.    Vocal Strengthening Exercises    >Sit in a comfortable and  upright position. The chair should have a hard back and hard arm rests.  >Rest your hands on the arms of the chair. Push down with both of your hands at the same time. Do not use too much shoulder movement, simply push with your hands. You should feel a tightening of your abdominal muscles, not your neck muscles.  >After you become comfortable with the pushing motion, begin to make a short sound (such as at/ ah) each time you push down. You can substitute other short sounds such as a vowel sound or counting.  >Be sure to take a deep breath before each push down and vocalization of the sound.  Summary of exercise sequence:   Take a deep breath in  Push down while saying ah  Do NOT hold your breath  Relax and breathe out  Repeat above sequence 15-20 times every hour or several times each day.    Speech Pathology    Frequently, physicians and speech pathologists treat voice disorders together. These are highly trained individuals who specialize in therapy for the voice box and throat. You may recognize the better as speech, swallowing or voice therapists. Dr. Ibarra may elect to refer you for evaluation and treatment, depending on your diagnosis and condition.    Special Testing    You may be referred certain tests to fully evaluate your throat, swallowing or voice. These may include CAT scans, swallowing tests, MRIs, or videostroboscopy. You may not be familiar these tests but the results will discussed them with you at the time of your office visit and answer your questions about them.      Cough syrup- use at bedtime for one week    Thank you for enrolling in [facility]. Please follow the instructions below to securely access your online medical record. [facility] allows you to send messages to your doctor, view your test results, renew your prescriptions, schedule appointments, and more.    How Do I Sign Up?  1. In your Internet browser, go to [employer]ions  2. Click on the Sign Up Now link in the New User?  box.   3. Enter your [employer] Activation Code exactly as it appears below. You will not need to use this code after you have completed the sign-up process. If you do not sign up before the expiration date, you must request a new code.  [employer] Activation Code: [email or web address]  Expires: 3/13/2020 12:46 PM    4. Enter the last four digits of your Social Security Number and your Date of Birth as indicated and click Next. You will be taken to the next sign-up page.  5. Create a [employer] username. Think of one that is secure and easy to remember.  6. Create a [employer] password. You can change your password at any time.  7. Choose a security question, enter your answer, and click Next. This can be used to access [facility] [employer] [facility] if you forget your password.   8. Select your communication preference. Enter a valid e-mail address if you would like to receive e-mail notifications when new information is available in [facility] [employer] [facility].  9. Click Sign In. You can now view your medical record.     Additional Information  If you have questions, you can e-mail [email or web address], or call [phone] to talk to our [employer] staff. Remember, [employer] is NOT to be used for urgent needs. For medical emergencies, dial 911.

## 2020-02-12 NOTE — PROGRESS NOTES
Obinna Ibarra Jr, MD     ENT NEW PATIENT NOTE     Chief Complaint   Patient presents with   • Cough        HISTORY OF PRESENT ILLNESS:  Accompanied by:   No one  Arnie Ross is a  74 y.o. female with cough. Began when sick in 10/2019. She has persisted.  Denies much fever.  Can be worse. Non- Productive.  She is clearing throat.  She has anytime and with talking.  Denies heartburn. No Diagnosis reflux. Sinus- Xray normal.  She has PND.  Smoke- none  Drink- none  Allergies- has had in the fall. Not as much.  CPAP- she is using nightly. She has good effect.    Review of Systems  Reviewed per patient intake note and confirmed with patient    Past History:  Past Medical History:   Diagnosis Date   • Hard of hearing    • Hyperlipidemia    • Hypertension    • Sleep apnea with use of continuous positive airway pressure (CPAP)      Past Surgical History:   Procedure Laterality Date   • APPENDECTOMY N/A 5/4/2018    Procedure: APPENDECTOMY LAPAROSCOPIC;  Surgeon: Jackie Grewal MD;  Location: [facility];  Service: General   • CATARACT EXTRACTION     • D&C CERVICAL BIOPSY      X 2   • GALLBLADDER SURGERY  2011     Family History   Problem Relation Age of Onset   • Cancer Sister    • Diabetes Sister    • Cancer Brother      Social History     Tobacco Use   • Smoking status: Never Smoker   • Smokeless tobacco: Never Used   Substance Use Topics   • Alcohol use: No   • Drug use: No     Outpatient Medications Marked as Taking for the 2/12/20 encounter (Office Visit) with Obinna Ibarra Jr., MD   Medication Sig Dispense Refill   • benzonatate (TESSALON) 100 MG capsule Take 100 mg by mouth 3 (Three) Times a Day As Needed for Cough.       Allergies:  Augmentin [amoxicillin-pot clavulanate]; Codeine; and Sulfa antibiotics        Vital Signs:   Temp:  [97.4 °F (36.3 °C)] 97.4 °F (36.3 °C)  Heart Rate:  [81] 81  BP: (145)/(82) 145/82  EXAMINATION:  CONSTITUTIONAL:    well nourished, well-developed, alert, oriented, in no  acute distress    With throat clearing    BODY HABITUS:    Normal body habitus    COMMUNICATION:    able to communicate normally, normal voice quality    HEAD:     Normocephalic, without obvious abnormality, atraumatic    FACE:    structure normal, no tenderness present, no lesions/masses, no evidence of trauma    SALIVARY GLANDS:    parotid glands with no tenderness, no swelling, no masses, submandibular glands with normal size, nontender     EYE:    ocular motility normal, eyelids normal, orbits normal, no proptosis, conjunctiva clear, sclera non-icteric, pupils equal, round, reactive to light and accomodation  Color:   brown    HEARING:    response to conversational voice normal    EARS:    Otoscopic exam   normal pinna with no lesions, Canals normal size and shape, Tympanic membranes normal, Ossicular chain intact, Middle ear clear     NOSE EXTERNAL:    APPEARANCE: normal, straight, with good projection, no tenderness, no lesions, no tenderness, good nasal support, patent nares    NOSE INTERNAL:    Nasal endoscopy        See procedure note for details Bilateral    ORAL CAVITY:    Normal lips with no lesions, dentition normal for age, FOM intact without lesions and normal salivary flow, Mucosa intact without lesions, Hard and soft palate normal without lesions    OROPHARYNX:    Direct examination  oropharyngeal mucosa normal, tonsil(s) with normal appearance      NECK:    normal appearance, no masses, no lesions, larynx normal mobility, trachea midline    LYMPH NODES :    no adenopathy    THYROID:    no overt thyromegaly, no tenderness, nodules or mass present on palpation, position midline    CHEST/RESPIRATORY:    respiratory effort normal, no rales, rubs or wheezing, no stridor, normal appearance to chest    CARDIOVASCULAR:    regular rate and rhythm, no murmurs, gallups, no peripheral edema    NEURO/PSYCHIATRIC :    oriented appropriately for age, mood normal, affect appropriate, cranial nerves intact grossly  (unless specifically described), gait normal for age    RESULTS REVIEW:    I have reviewed the patients old records in the chart.  I have personally reviewed the patient's xray  of the sinuses report.        ASSESSMENT:   Diagnosis Plan   1. Cough  HYDROcod Polst-CPM Polst ER (TUSSIONEX PENNKINETIC ER) 10-8 MG/5ML ER suspension    persistent  Post-viral   2. Post-nasal catarrh      from MARYURI   3. GERD without esophagitis      from cough   4. Acquired deviated nasal septum      R to l low moderate  L to R mid mild   5. Sensorineural hearing loss (SNHL) of both ears      To be evaluated  Audio done elsewhere   6. Sleep apnea in adult             PLAN:  Medical and surgical options were discussed including observation, continued medical management, medication modification, surgical management and CT scanning. Risks, benefits and alternatives were discussed and questions were answered. After considering the options, the patient decided to proceed with medication modification.  Patient has severe cough from MARYURI after viral illness. I will start MARYURI precautions and cough suppression.  She will bring hearing test on return to discuss.  Reflux precautions  Diet  Exercise  Prilosec at bedtime  Tussionex 5 cc at bedtime x 1 week  Antacids  Carafate  Flonase BID  Atrovent for drainage  Voice hygiene  MY CHART:  Patient is Encouraged to enroll in My Chart  Encouraged to review data and findings in My Chart  New Medications Ordered This Visit   Medications   • HYDROcod Polst-CPM Polst ER (TUSSIONEX PENNKINETIC ER) 10-8 MG/5ML ER suspension     Sig: Take 5 mL by mouth Every Night. Indications: Cough, for 7 days     Dispense:  50 mL     Refill:  0   • omeprazole (priLOSEC) 40 MG capsule     Sig: Take 1 capsule by mouth Every Night for 30 days. Take 40 mg by mouth 30 minut before going to bed     Dispense:  30 capsule     Refill:  3   • fluticasone (FLONASE) 50 MCG/ACT nasal spray     Si sprays into the nostril(s) as directed by  provider 2 (Two) Times a Day.     Dispense:  48 g     Refill:  3   • sucralfate (CARAFATE) 1 g tablet     Sig: Take 1 tablet by mouth 2 (Two) Times a Day.     Dispense:  60 tablet     Refill:  3   • ipratropium (ATROVENT) 0.06 % nasal spray     Si sprays into the nostril(s) as directed by provider 4 (Four) Times a Day As Needed for Rhinitis. For drainage     Dispense:  45 mL     Refill:  3             Patient understand(s) and agree(s) with the treatment plan as described.  Return in about 6 weeks (around 3/25/2020) for Recheck Cough.       Obinna Ibarra Jr, MD  20  1:27 PM

## 2020-02-17 ENCOUNTER — TELEPHONE (OUTPATIENT)
Dept: OTOLARYNGOLOGY | Facility: CLINIC | Age: 75
End: 2020-02-17

## 2020-02-17 DIAGNOSIS — R05.9 COUGH: Primary | ICD-10-CM

## 2020-02-17 NOTE — TELEPHONE ENCOUNTER
PT called stating that pharm will not fill the full script of tussinex and that they need a new script for the remainder amount.

## 2020-05-05 ENCOUNTER — OFFICE VISIT (OUTPATIENT)
Dept: OTOLARYNGOLOGY | Facility: CLINIC | Age: 75
End: 2020-05-05

## 2020-05-05 DIAGNOSIS — J34.2 ACQUIRED DEVIATED NASAL SEPTUM: ICD-10-CM

## 2020-05-05 DIAGNOSIS — K21.9 GERD WITHOUT ESOPHAGITIS: ICD-10-CM

## 2020-05-05 DIAGNOSIS — J31.1 POST-NASAL CATARRH: ICD-10-CM

## 2020-05-05 DIAGNOSIS — R05.9 COUGH: Primary | ICD-10-CM

## 2020-05-05 DIAGNOSIS — G47.30 SLEEP APNEA IN ADULT: ICD-10-CM

## 2020-05-05 DIAGNOSIS — H90.3 SENSORINEURAL HEARING LOSS (SNHL) OF BOTH EARS: ICD-10-CM

## 2020-05-05 PROCEDURE — 99213 OFFICE O/P EST LOW 20 MIN: CPT | Performed by: OTOLARYNGOLOGY

## 2020-05-05 NOTE — PATIENT INSTRUCTIONS
"THE PROBLEM OF ACID REFLUX    In BOTH children and adults, irritating acids may leak from the stomach and come up into the esophagus and throat. This can occur at any time, but occurs more commonly when lying down. When the acid touches the lining of the esophagus, there is irritation and muscle spasm, which can be felt up into the throat. Usually this is felt as \"heartburn\". When scarring of the esophagus occurs, the esophagus becomes less sensitive and the symptoms may only be in the throat.     Some of the symptoms that can occur with acid reflux into the throat include coughing, soreness, burning, hoarseness, throat clearing, excessive mucous, bad taste in the mouth, bad breath, a sensation of a lump in the throat, wheezing, post-nasal drip, choking spells, bleeding and nausea. In a small percentage of people, more serious problems may result, such as pneumonia, ulcers in the larynx (voice box), reduced mobility of the vocal cords and a pouch in the upper esophagus (diverticulum). In children, the symptoms may be different and include persistent vomiting, bleeding from the esophagus, respiratory problems, pneumonia, asthma, choking spells, swallowing problems and anemia. In some cases, unexplained fussiness and crying is due to reflux.     The following instructions are designed to help neutralize the stomach acid, reduce the production of the acid, promote emptying of the acid from the stomach into the intestines and prevent acid from coming up into the esophagus. You should adopt enough of these changes to alleviate your symptoms. If carrying out these suggestions produces no change, it is imperative that you return so that we can discuss further the problem. More testing may be required, as might medication. It is important to realize that the esophagus takes time to heal, so you should not expect results immediately.    Diet  Most often, the throat symptoms above are due to dietary abuses. Certain foods are " known to cause irritation, because they are acids themselves or cause excess production of stomach acid. These should be avoided, if possible. The following is a partial list of foods recognized as troublesome: coffee (even decaffeinated), tea chocolate, cola beverages, citrus beverages (such as 7-Up), caffeine containing beverages, alcohol, citrus fruits and juices, highly spiced foods, fatty foods, candies, nuts, mints, milk and milk products. Some of the worst offenders for promoting stomach acid are milk and beer.     Hard candies, gum, breath fresheners, throat lozenges, cough drops, mouthwashes, gargles, may actually irritate the throat directly (many cough drops and lozenges contain irritants such as oil of eucalyptus and menthol), and can also stimulate acid production directly.     Large amounts of liquid in the diet tend to promote reflux, because liquids tend to come back up more easily than solids.     Meals should be bland and consist of real food, trying to avoid recreational food. Multiple small meals, rather than one or two large meals helps prevent reflux by not stretching the stomach and increasing the time needed for digestion, as well increasing the amount of acid needed to digest the meal. If you are overweight, losing weight is tremendously helpful.     YOU SHOULD NOT EAT FOR AT LEAST TWO HOURS BEFORE RETIRING AND YOU SHOULD REMAIN SITTING OR STANDING FOR AT LEAST 45 MINUTES AFTER EACH MEAL. This promotes passage of food from the stomach into the intestine.    Posture  Body weight is a significant factor in promoting reflux. Losing weight is beneficial. Pregnancy will markedly increase the symptoms of heartburn and throat pain. You should avoid clothing that fits tightly across the mid-section, as this promotes squeezing of the abdominal contents upward. It is helpful to practice abdominal or diaphragmatic breathing, using the stomach to push out each breath rather than chest expansion. Avoid  slumping while sitting, and avoid stooping or straining.     For many, the reflux occurs at night while sleeping. This is the time acid production is the greatest and you are lying down, a position that promotes reflux, thus setting up the irritation that occurs the next morning. One of the most important things you can do is to elevate the head of the bed, in an effort to use gravity to keep the acid in the stomach. This is accomplished by placed blocks or bricks beneath the legs at the head of the bed, raising the head 6-10 inches. Do not make the head so high that you slide down. Pillows are not effective because they cause the body to curl, increasing abdominal pressure and it is difficult to remain upright on them. Additionally, pillows promote sleeping on the back, but it is far more desirable to sleep on the right side or on the stomach, as this allows gas to escape, while preventing the escape of acid material. Children and infants, who are refluxing, should sleep on their stomachs.  Exercise  Regular exercise is extremely beneficial in promoting good digestion and regularity. The abdominal muscles are toned, which aids in controlling acid problems. However, it is recommended that you not participate in vigorous activity immediately after eating. This causes pressure on an already full stomach, forcing acid up into the esophagus. Regular exercise is encouraged, prior to meals, or two hours after meals.  Antacids  Antacids should be used regularly, as they neutralize excess acid. Gelusil II, Mylanta II, Tums and Rolaids are popular brands. Gaviscon is not an antacid, but floats on top of the stomach acid, preventing esophageal irritation. Care should be used in administering large doses of antacids, especially in children. Many antacid preparations contain magnesium, which promotes frequent bowel movements, while antacids that contain aluminum can be constipating. Tums have a high calcium content that can be  used to some advantage in older women with reflux.     Antacid tablets are convenient, but the liquid form tends to work much more quickly. Also remember to check with your physician about interference with other medications. Antacids can slow the absorption of digitalis, Indocin, tetracyclines, fluorides and isoniazid from the intestines. Many medications require the presence of stomach acid to be absorbed.     Initially, antacids should be used 30 minutes after each meal, 2 hours after each meal and at bedtime. This maximizes the neutralization of the stomach acid. Antacids can be used more frequently if symptoms persist, but such extensive use needs to be reviewed with your physician.  Prescription Medications  If the above recommendations are not effectively resolving the symptoms, medications may be prescribed. These are usually in the form of acid production blockers, such as Tagamet, Zantac, Pepcid, or Axid or acid pump inhibitors like Prevacid, Nexium, Protonix or Prilosec. These drugs help stop acid production in the stomach. Medications such as Reglan can be used to promote stomach emptying.  Medications That Promote Reflux  Certain medications can promote acid reflux; either by relaxing the sphincter muscle that helps keeps stomach acid down, or increasing the acid production. These include progesterone (Provera, Ortho Novum, Ovral, other birth control pills), theophylline (Noe-Dur, etc.), anticholinergic (Donnatal, Scopolamine, Probanthine, Bentil, others), beta blockers (Inderal, Tenormin and Corgard), alpha blockers (Dibenzyline), dopamine, calcium channel blockers (Procardia, Cardizem, Isotin, Calan), aspirin, non-steroidal anti-inflammatory drugs (Motrin, Advil, Nuprin, Nalfon, Rufen, Indocin, Feldene, Clinoril and Tolectin). Vitamin C is an acid and can promote reflux. This is only a partial list and before stopping any prescription medication, you should check with your physician.    Goal  The  goal is to reduce the symptoms with the least number of lifestyle changes. A combination of the above suggestions is frequently prescribed to return you to normal as quickly as possible. However, this problem did not develop overnight and will not disappear rapidly. Therefore, developing a regimen will aid in controlling symptoms and keep you symptom free for a long period of time. Other alternatives exist, should these suggestions fail, and can be discussed with your physician.     To Summarize:  Watch your diet, avoid foods that cause acid reflux  Lose weight  Avoid tight fitting clothes  Adjust your posture, raise the head of the bed  Exercise regularly  Use antacids  Use prescription medication as directed  Avoid medications that promote reflux  Avoid behavior and eating habits that promote reflux of stomach acid     You are welcome to do a Google search on the topic of reflux and reflux diets. The internet has many useful resources.     Please remember, your success in controlling this condition depends on many factors including diet, exercise, medications and follow up. All are important and must be utilized to achieve success.      [email or web address]

## 2020-08-05 ENCOUNTER — OFFICE VISIT (OUTPATIENT)
Dept: OTOLARYNGOLOGY | Facility: CLINIC | Age: 75
End: 2020-08-05

## 2020-08-05 VITALS — TEMPERATURE: 97.5 F | WEIGHT: 170 LBS | BODY MASS INDEX: 30.12 KG/M2 | HEIGHT: 63 IN

## 2020-08-05 DIAGNOSIS — J34.2 ACQUIRED DEVIATED NASAL SEPTUM: ICD-10-CM

## 2020-08-05 DIAGNOSIS — J31.1 POST-NASAL CATARRH: ICD-10-CM

## 2020-08-05 DIAGNOSIS — R05.9 COUGH: Primary | ICD-10-CM

## 2020-08-05 DIAGNOSIS — K21.9 GERD WITHOUT ESOPHAGITIS: ICD-10-CM

## 2020-08-05 DIAGNOSIS — G47.30 SLEEP APNEA IN ADULT: ICD-10-CM

## 2020-08-05 DIAGNOSIS — H90.3 SENSORINEURAL HEARING LOSS (SNHL) OF BOTH EARS: ICD-10-CM

## 2020-08-05 PROCEDURE — 99213 OFFICE O/P EST LOW 20 MIN: CPT | Performed by: OTOLARYNGOLOGY

## 2020-08-05 RX ORDER — ROSUVASTATIN CALCIUM 10 MG/1
TABLET, COATED ORAL
COMMUNITY
Start: 2020-06-24

## 2020-08-05 RX ORDER — HYDROCHLOROTHIAZIDE 25 MG/1
TABLET ORAL
COMMUNITY

## 2020-08-05 RX ORDER — LOSARTAN POTASSIUM 50 MG/1
50 TABLET ORAL DAILY
COMMUNITY

## 2020-08-05 NOTE — PROGRESS NOTES
Obinna Ibarra Jr, MD     ENT FOLLOW UP NOTE     Chief Complaint   Patient presents with   • Follow-up     cough        HISTORY OF PRESENT ILLNESS:  Accompanied by:  No one  Arnie Ross is a  75 y.o. female who is here for follow up. She is better.  Cough- much better. Still has gunk.  She says Atrovent works.  She is not managing medications well.  She has not completely resolved cough. She has good days and bad days.  Diet- doing better.  Exercise- walking daily.    Review of Systems   Constitutional: Negative for appetite change, fatigue and fever.   HENT:        See HPI   Respiratory: Positive for cough. Negative for choking and shortness of breath.    Gastrointestinal: Negative for constipation, diarrhea, nausea and vomiting.   Skin: Negative for rash.   Neurological: Negative for dizziness, light-headedness and headaches.   Psychiatric/Behavioral: Negative for sleep disturbance.       Past History:  Past medical and surgical history, family history and social history reviewed and updated when appropriate.  Current medications and allergies reviewed and updated when appropriate.  Allergies:  Augmentin [amoxicillin-pot clavulanate]; Codeine; and Sulfa antibiotics        Vital Signs:   Temp:  [97.5 °F (36.4 °C)] 97.5 °F (36.4 °C)  EXAMINATION:  CONSTITUTIONAL:    well nourished, well-developed, alert, oriented, in no acute distress    With throat clearing- diminshed     BODY HABITUS:    Normal body habitus     COMMUNICATION:    able to communicate normally, normal voice quality     HEAD:     Normocephalic, without obvious abnormality, atraumatic     FACE:    structure normal, no tenderness present, no lesions/masses, no evidence of trauma     SALIVARY GLANDS:    parotid glands with no tenderness, no swelling, no masses, submandibular glands with normal size, nontender      EYE:    ocular motility normal, eyelids normal, orbits normal, no proptosis, conjunctiva clear, sclera non-icteric, pupils equal, round,  reactive to light and accomodation  Color:   brown     HEARING:    response to conversational voice normal     EARS:    Otoscopic exam   normal pinna with no lesions, Canals normal size and shape, Tympanic membranes normal, Ossicular chain intact, Middle ear clear     NOSE EXTERNAL:    APPEARANCE: normal, straight, with good projection, no tenderness, no lesions, no tenderness, good nasal support, patent nares     NOSE INTERNAL:    NASALMUCOSA:    abnormal:        Bilateral- bluish, thick    NASAL PASSAGES:     abnormal   Bilateral- narrowed    NASAL VALVE:     intact, good support and no nasal obstruction   SEPTUM:     mucosa abnormal   Bilateral- bluish, thick     deviation present:      deviated Right mid mild     deviated Left low moderate   INFERIOR TURBINATES:     normal size, non-obstructing, no lesions     ORAL CAVITY:    Normal lips with no lesions, dentition normal for age, FOM intact without lesions and normal salivary flow, Mucosa intact without lesions, Hard and soft palate normal without lesions     OROPHARYNX:    Direct examination  oropharyngeal mucosa normal, tonsil(s) with normal appearance  Slightly larger than expected without lesion     NECK:    normal appearance, no masses, no lesions, larynx normal mobility, trachea midline     LYMPH NODES :    no adenopathy     THYROID:    no overt thyromegaly, no tenderness, nodules or mass present on palpation, position midline     CHEST/RESPIRATORY:    respiratory effort normal, no rales, rubs or wheezing, no stridor, normal appearance to chest     CARDIOVASCULAR:    regular rate and rhythm, no murmurs, gallups, no peripheral edema     NEURO/PSYCHIATRIC :    oriented appropriately for age, mood normal, affect appropriate, cranial nerves intact grossly (unless specifically described), gait normal for age    RESULTS REVIEW:    I have reviewed the patients old records in the chart.           ASSESSMENT:      Diagnosis Plan   1. Cough      Improved   2.  Post-nasal catarrh      Improved on meds   3. GERD without esophagitis     4. Acquired deviated nasal septum      No change   5. Sensorineural hearing loss (SNHL) of both ears      no change   6. Sleep apnea in adult      no change           PLAN:      Continue current management plan.  Patient has markedly improved cough. I will continue MARYURI recommendations, voice hygiene and throat clearing precautions. I will plan medications wean, mark sulcrafate after next visit.  Reflux precautions  Antacids  Flonase BID  Nasal saline  Atrovent  Tessalon perles- as needed  Carafate BID  MY CHART:  Patient is Patient is using My Chart           Patient understand(s) and agree(s) with the treatment plan as described.    Return in about 3 months (around 11/5/2020) for Recheck cough, throat.     Obinna Ibarra Jr, MD  08/05/20  11:37

## 2020-08-05 NOTE — PATIENT INSTRUCTIONS
"THE PROBLEM OF ACID REFLUX    In BOTH children and adults, irritating acids may leak from the stomach and come up into the esophagus and throat. This can occur at any time, but occurs more commonly when lying down. When the acid touches the lining of the esophagus, there is irritation and muscle spasm, which can be felt up into the throat. Usually this is felt as \"heartburn\". When scarring of the esophagus occurs, the esophagus becomes less sensitive and the symptoms may only be in the throat.     Some of the symptoms that can occur with acid reflux into the throat include coughing, soreness, burning, hoarseness, throat clearing, excessive mucous, bad taste in the mouth, bad breath, a sensation of a lump in the throat, wheezing, post-nasal drip, choking spells, bleeding and nausea. In a small percentage of people, more serious problems may result, such as pneumonia, ulcers in the larynx (voice box), reduced mobility of the vocal cords and a pouch in the upper esophagus (diverticulum). In children, the symptoms may be different and include persistent vomiting, bleeding from the esophagus, respiratory problems, pneumonia, asthma, choking spells, swallowing problems and anemia. In some cases, unexplained fussiness and crying is due to reflux.     The following instructions are designed to help neutralize the stomach acid, reduce the production of the acid, promote emptying of the acid from the stomach into the intestines and prevent acid from coming up into the esophagus. You should adopt enough of these changes to alleviate your symptoms. If carrying out these suggestions produces no change, it is imperative that you return so that we can discuss further the problem. More testing may be required, as might medication. It is important to realize that the esophagus takes time to heal, so you should not expect results immediately.    Diet  Most often, the throat symptoms above are due to dietary abuses. Certain foods are " known to cause irritation, because they are acids themselves or cause excess production of stomach acid. These should be avoided, if possible. The following is a partial list of foods recognized as troublesome: coffee (even decaffeinated), tea chocolate, cola beverages, citrus beverages (such as 7-Up), caffeine containing beverages, alcohol, citrus fruits and juices, highly spiced foods, fatty foods, candies, nuts, mints, milk and milk products. Some of the worst offenders for promoting stomach acid are milk and beer.     Hard candies, gum, breath fresheners, throat lozenges, cough drops, mouthwashes, gargles, may actually irritate the throat directly (many cough drops and lozenges contain irritants such as oil of eucalyptus and menthol), and can also stimulate acid production directly.     Large amounts of liquid in the diet tend to promote reflux, because liquids tend to come back up more easily than solids.     Meals should be bland and consist of real food, trying to avoid recreational food. Multiple small meals, rather than one or two large meals helps prevent reflux by not stretching the stomach and increasing the time needed for digestion, as well increasing the amount of acid needed to digest the meal. If you are overweight, losing weight is tremendously helpful.     YOU SHOULD NOT EAT FOR AT LEAST TWO HOURS BEFORE RETIRING AND YOU SHOULD REMAIN SITTING OR STANDING FOR AT LEAST 45 MINUTES AFTER EACH MEAL. This promotes passage of food from the stomach into the intestine.    Posture  Body weight is a significant factor in promoting reflux. Losing weight is beneficial. Pregnancy will markedly increase the symptoms of heartburn and throat pain. You should avoid clothing that fits tightly across the mid-section, as this promotes squeezing of the abdominal contents upward. It is helpful to practice abdominal or diaphragmatic breathing, using the stomach to push out each breath rather than chest expansion. Avoid  slumping while sitting, and avoid stooping or straining.     For many, the reflux occurs at night while sleeping. This is the time acid production is the greatest and you are lying down, a position that promotes reflux, thus setting up the irritation that occurs the next morning. One of the most important things you can do is to elevate the head of the bed, in an effort to use gravity to keep the acid in the stomach. This is accomplished by placed blocks or bricks beneath the legs at the head of the bed, raising the head 6-10 inches. Do not make the head so high that you slide down. Pillows are not effective because they cause the body to curl, increasing abdominal pressure and it is difficult to remain upright on them. Additionally, pillows promote sleeping on the back, but it is far more desirable to sleep on the right side or on the stomach, as this allows gas to escape, while preventing the escape of acid material. Children and infants, who are refluxing, should sleep on their stomachs.  Exercise  Regular exercise is extremely beneficial in promoting good digestion and regularity. The abdominal muscles are toned, which aids in controlling acid problems. However, it is recommended that you not participate in vigorous activity immediately after eating. This causes pressure on an already full stomach, forcing acid up into the esophagus. Regular exercise is encouraged, prior to meals, or two hours after meals.  Antacids  Antacids should be used regularly, as they neutralize excess acid. Gelusil II, Mylanta II, Tums and Rolaids are popular brands. Gaviscon is not an antacid, but floats on top of the stomach acid, preventing esophageal irritation. Care should be used in administering large doses of antacids, especially in children. Many antacid preparations contain magnesium, which promotes frequent bowel movements, while antacids that contain aluminum can be constipating. Tums have a high calcium content that can be  used to some advantage in older women with reflux.     Antacid tablets are convenient, but the liquid form tends to work much more quickly. Also remember to check with your physician about interference with other medications. Antacids can slow the absorption of digitalis, Indocin, tetracyclines, fluorides and isoniazid from the intestines. Many medications require the presence of stomach acid to be absorbed.     Initially, antacids should be used 30 minutes after each meal, 2 hours after each meal and at bedtime. This maximizes the neutralization of the stomach acid. Antacids can be used more frequently if symptoms persist, but such extensive use needs to be reviewed with your physician.  Prescription Medications  If the above recommendations are not effectively resolving the symptoms, medications may be prescribed. These are usually in the form of acid production blockers, such as Tagamet, Zantac, Pepcid, or Axid or acid pump inhibitors like Prevacid, Nexium, Protonix or Prilosec. These drugs help stop acid production in the stomach. Medications such as Reglan can be used to promote stomach emptying.  Medications That Promote Reflux  Certain medications can promote acid reflux; either by relaxing the sphincter muscle that helps keeps stomach acid down, or increasing the acid production. These include progesterone (Provera, Ortho Novum, Ovral, other birth control pills), theophylline (Noe-Dur, etc.), anticholinergic (Donnatal, Scopolamine, Probanthine, Bentil, others), beta blockers (Inderal, Tenormin and Corgard), alpha blockers (Dibenzyline), dopamine, calcium channel blockers (Procardia, Cardizem, Isotin, Calan), aspirin, non-steroidal anti-inflammatory drugs (Motrin, Advil, Nuprin, Nalfon, Rufen, Indocin, Feldene, Clinoril and Tolectin). Vitamin C is an acid and can promote reflux. This is only a partial list and before stopping any prescription medication, you should check with your physician.    Goal  The  goal is to reduce the symptoms with the least number of lifestyle changes. A combination of the above suggestions is frequently prescribed to return you to normal as quickly as possible. However, this problem did not develop overnight and will not disappear rapidly. Therefore, developing a regimen will aid in controlling symptoms and keep you symptom free for a long period of time. Other alternatives exist, should these suggestions fail, and can be discussed with your physician.     To Summarize:  Watch your diet, avoid foods that cause acid reflux  Lose weight  Avoid tight fitting clothes  Adjust your posture, raise the head of the bed  Exercise regularly  Use antacids  Use prescription medication as directed  Avoid medications that promote reflux  Avoid behavior and eating habits that promote reflux of stomach acid     You are welcome to do a Google search on the topic of reflux and reflux diets. The internet has many useful resources.     Please remember, your success in controlling this condition depends on many factors including diet, exercise, medications and follow up. All are important and must be utilized to achieve success.     ANTACID USE:  Use antacids 30 mins after every meal, 2 hours after every meal and at Bedtime  Use Gaviscon Extra (best), Tums, Rolaids, etc  Over the counter  Use 2 tsp or 2 tabs as the dose  Remember that some of these products contain Calcium or Aluminum. If you have dietary or medical issues, please inform physician    NASAL SALINE:  Use 2 puffs each nostril 4-6 times daily and more frequently if possible.  You can buy saline spray or you can make your own and use an old spray bottle to administer  Use a humidifier at bedside  Recipe for saline:d  Water                                 1 quart  Salt (table)                        1 tablespoon  Gylcerin (or Aretha Syrup)    1 teaspoon  Sodium bicarbonate           1 teaspoon  Sprays or Roxanne pots are recommended    Nasal steroid  use:  Using nasal steroids:  You will be prescribed one of the following nasal steroids: Flonase, Nasacort, Nasonex, Rhinocort, Qnasl, Zetonna  2 puffs each nostril 2 times daily  Start as soon as possible  If you are using Afrin for 3 days with the nasal steroid,  Use Afrin first and wait 10 minutes to allow the nose to open. Then administer nasal steroids.    Ipratromium Bromide (Atrovent) spray use 1-2 puffs each nostril 3-4 times daily AS needed for drainage    Diet  Exercise    Tessalon perles for cough as needed     [email or web address]

## 2020-09-22 RX ORDER — SUCRALFATE 1 G/1
1 TABLET ORAL 2 TIMES DAILY
Qty: 60 TABLET | Refills: 2 | Status: SHIPPED | OUTPATIENT
Start: 2020-09-22 | End: 2020-12-21

## 2020-09-22 RX ORDER — OMEPRAZOLE 40 MG/1
40 CAPSULE, DELAYED RELEASE ORAL DAILY
Qty: 30 CAPSULE | Refills: 2 | Status: SHIPPED | OUTPATIENT
Start: 2020-09-22 | End: 2020-10-04 | Stop reason: SDUPTHER

## 2020-10-06 RX ORDER — OMEPRAZOLE 40 MG/1
40 CAPSULE, DELAYED RELEASE ORAL DAILY
Qty: 30 CAPSULE | Refills: 2 | Status: SHIPPED | OUTPATIENT
Start: 2020-10-06 | End: 2020-11-05

## 2020-11-09 ENCOUNTER — OFFICE VISIT (OUTPATIENT)
Dept: OTOLARYNGOLOGY | Facility: CLINIC | Age: 75
End: 2020-11-09

## 2020-11-09 DIAGNOSIS — H90.3 SENSORINEURAL HEARING LOSS (SNHL) OF BOTH EARS: ICD-10-CM

## 2020-11-09 DIAGNOSIS — G47.30 SLEEP APNEA IN ADULT: ICD-10-CM

## 2020-11-09 DIAGNOSIS — K21.9 GERD WITHOUT ESOPHAGITIS: ICD-10-CM

## 2020-11-09 DIAGNOSIS — J31.1 POST-NASAL CATARRH: ICD-10-CM

## 2020-11-09 DIAGNOSIS — J34.2 ACQUIRED DEVIATED NASAL SEPTUM: ICD-10-CM

## 2020-11-09 DIAGNOSIS — R05.9 COUGH: Primary | ICD-10-CM

## 2020-11-09 DIAGNOSIS — K21.9 GASTROESOPHAGEAL REFLUX DISEASE WITHOUT ESOPHAGITIS: ICD-10-CM

## 2020-11-09 PROCEDURE — 99213 OFFICE O/P EST LOW 20 MIN: CPT | Performed by: OTOLARYNGOLOGY

## 2020-11-09 RX ORDER — DOXEPIN HYDROCHLORIDE 25 MG/1
25 CAPSULE ORAL NIGHTLY
Qty: 30 CAPSULE | Refills: 3 | Status: SHIPPED | OUTPATIENT
Start: 2020-11-09

## 2020-11-09 RX ORDER — BENZONATATE 100 MG/1
100 CAPSULE ORAL 2 TIMES DAILY PRN
Qty: 60 CAPSULE | Refills: 3 | Status: SHIPPED | OUTPATIENT
Start: 2020-11-09 | End: 2020-12-21

## 2020-11-09 RX ORDER — POTASSIUM CHLORIDE 750 MG/1
10 TABLET, FILM COATED, EXTENDED RELEASE ORAL DAILY
COMMUNITY
Start: 2020-08-10

## 2020-11-09 RX ORDER — INFLUENZA A VIRUS A/MICHIGAN/45/2015 X-275 (H1N1) ANTIGEN (FORMALDEHYDE INACTIVATED), INFLUENZA A VIRUS A/SINGAPORE/INFIMH-16-0019/2016 IVR-186 (H3N2) ANTIGEN (FORMALDEHYDE INACTIVATED), INFLUENZA B VIRUS B/PHUKET/3073/2013 ANTIGEN (FORMALDEHYDE INACTIVATED), AND INFLUENZA B VIRUS B/MARYLAND/15/2016 BX-69A ANTIGEN (FORMALDEHYDE INACTIVATED) 60; 60; 60; 60 UG/.7ML; UG/.7ML; UG/.7ML; UG/.7ML
INJECTION, SUSPENSION INTRAMUSCULAR
COMMUNITY
Start: 2020-09-18

## 2020-11-09 NOTE — PATIENT INSTRUCTIONS
NASAL SALINE:  Use 2 puffs each nostril 4-6 times daily and more frequently if possible.  You can buy saline spray or you can make your own and use an old spray bottle to administer  Use a humidifier at bedside  Recipe for saline:  Water                                 1 quart  Salt (table)                        1 tablespoon  Gylcerin (or Aretha Syrup)    1 teaspoon  Sodium bicarbonate           1 teaspoon  Sprays or Roxanne pots are recommended    Nasal steroid use:  Using nasal steroids:  You will be prescribed one of the following nasal steroids: Flonase, Nasacort, Nasonex, Rhinocort, Qnasl, Zetonna  2 puffs each nostril 2 times daily  Start as soon as possible  If you are using Afrin for 3 days with the nasal steroid,  Use Afrin first and wait 10 minutes to allow the nose to open. Then administer nasal steroids.    Tessecharisse perles 1 2 times daily    Ipratromium Bromide (Atrovent) spray use 1-2 puffs each nostril 3-4 times daily AS needed for drainage    Stop Sulcrafate    Omeprazole at bedtime    Doxepin 25 mg at night for cough suppression     [email or web address]

## 2020-11-09 NOTE — PROGRESS NOTES
Obinna Ibarra Jr, MD     ENT FOLLOW UP NOTE     Chief Complaint   Patient presents with   • Follow-up     Cough        HISTORY OF PRESENT ILLNESS:  Accompanied by:  No one  Arnie Ross is a  75 y.o. female who is here for follow up. She says she is not really coughing.  She will hack a bit. Lately she has had in yard.  She had history asthma as a child.  She started 10/2019.  She has intermittent spells now. She has had numerous antibiotics, steroids.  She will go a week without coughing.  Not coughing in sleep. Notes more after outside.  Atrovent is helping.    Review of Systems   Constitutional: Negative for appetite change, fatigue and fever.   HENT:        See HPI   Respiratory: Positive for cough. Negative for choking and shortness of breath.    Gastrointestinal: Negative for diarrhea, nausea and vomiting.   Skin: Negative for rash.   Neurological: Negative for dizziness, light-headedness and headaches.   Psychiatric/Behavioral: Negative for sleep disturbance.       Past History:  Past medical and surgical history, family history and social history reviewed and updated when appropriate.  Current medications and allergies reviewed and updated when appropriate.  Allergies:  Augmentin [amoxicillin-pot clavulanate], Codeine, and Sulfa antibiotics        Vital Signs:      EXAMINATION:  CONSTITUTIONAL:    well nourished, well-developed, alert, oriented, in no acute distress       BODY HABITUS:    Normal body habitus     COMMUNICATION:    able to communicate normally, normal voice quality     HEAD:     Normocephalic, without obvious abnormality, atraumatic     FACE:    structure normal, no tenderness present, no lesions/masses, no evidence of trauma     SALIVARY GLANDS:    parotid glands with no tenderness, no swelling, no masses, submandibular glands with normal size, nontender      EYE:    ocular motility normal, eyelids normal, orbits normal, no proptosis, conjunctiva clear, sclera non-icteric, pupils equal,  round, reactive to light and accomodation  Color:   brown     HEARING:    response to conversational voice normal     EARS:    Otoscopic exam   normal pinna with no lesions, Canals normal size and shape, Tympanic membranes normal, Ossicular chain intact, Middle ear clear     NOSE EXTERNAL:    APPEARANCE: normal, straight, with good projection, no tenderness, no lesions, no tenderness, good nasal support, patent nares     NOSE INTERNAL:    NASALMUCOSA:    abnormal:        Bilateral- bluish, thick    NASAL PASSAGES:     abnormal   Bilateral- narrowed    NASAL VALVE:     intact, good support and no nasal obstruction   SEPTUM:     mucosa abnormal   Bilateral- bluish, thick     deviation present:      deviated Right mid mild     deviated Left low moderate   INFERIOR TURBINATES:     normal size, non-obstructing, no lesions     ORAL CAVITY:    Normal lips with no lesions, dentition normal for age, FOM intact without lesions and normal salivary flow, Mucosa intact without lesions, Hard and soft palate normal without lesions     OROPHARYNX:    Direct examination  oropharyngeal mucosa normal, tonsil(s) with normal appearance  Slightly larger than expected without lesion     NECK:    normal appearance, no masses, no lesions, larynx normal mobility, trachea midline     LYMPH NODES :    no adenopathy     THYROID:    no overt thyromegaly, no tenderness, nodules or mass present on palpation, position midline     CHEST/RESPIRATORY:    respiratory effort normal, no rales, rubs or wheezing, no stridor, normal appearance to chest     CARDIOVASCULAR:    regular rate and rhythm, no murmurs, gallups, no peripheral edema     NEURO/PSYCHIATRIC :    oriented appropriately for age, mood normal, affect appropriate, cranial nerves intact grossly (unless specifically described), gait normal for age  PE copied from prior exam, reviewed and confirmed    RESULTS REVIEW:    I have reviewed the patients old records in the chart.            ASSESSMENT:      Diagnosis Plan   1. Cough      Improved but still present   2. Post-nasal catarrh      Improved on Atrovent   3. GERD without esophagitis     4. Acquired deviated nasal septum     5. Sensorineural hearing loss (SNHL) of both ears     6. Sleep apnea in adult     7. Gastroesophageal reflux disease without esophagitis      Partially controlled           PLAN:      Continue current management plan.  Patient have improved overall cough symptoms.  I will continue reflux precautions with omeprazole at bedtime, stop Zocor fate, continue antacids, continue Flonase.  She will continue Atrovent for drainage.  I will start doxepin to see if the anticholinergic aspect will further decrease the cough.  She will use Tessalon 1 capsule twice daily.  Overall she is improving and I think with decreased cough, she will have decreased reflux and thus fewer symptoms.  Doxepin at bedtime 25 mg  Tesselon 100 mg BID  Flonase BID  Nasal saline  MY CHART:  Patient is Patient is using My Chart  New Medications Ordered This Visit   Medications   • benzonatate (Tessalon Perles) 100 MG capsule     Sig: Take 1 capsule by mouth 2 (Two) Times a Day As Needed for Cough.     Dispense:  60 capsule     Refill:  3   • doxepin (SINEquan) 25 MG capsule     Sig: Take 1 capsule by mouth Every Night.     Dispense:  30 capsule     Refill:  3          Patient understand(s) and agree(s) with the treatment plan as described.    Return in about 6 weeks (around 12/21/2020) for Recheck Cough, PND.     Obinna Ibarra Jr, MD  11/09/20  14:45 CST

## 2020-12-21 ENCOUNTER — OFFICE VISIT (OUTPATIENT)
Dept: OTOLARYNGOLOGY | Facility: CLINIC | Age: 75
End: 2020-12-21

## 2020-12-21 VITALS — HEIGHT: 63 IN | WEIGHT: 170 LBS | BODY MASS INDEX: 30.12 KG/M2

## 2020-12-21 DIAGNOSIS — G47.30 SLEEP APNEA IN ADULT: ICD-10-CM

## 2020-12-21 DIAGNOSIS — H90.3 SENSORINEURAL HEARING LOSS (SNHL) OF BOTH EARS: ICD-10-CM

## 2020-12-21 DIAGNOSIS — R05.9 COUGH: Primary | ICD-10-CM

## 2020-12-21 DIAGNOSIS — K21.9 GERD WITHOUT ESOPHAGITIS: ICD-10-CM

## 2020-12-21 DIAGNOSIS — J31.1 POST-NASAL CATARRH: ICD-10-CM

## 2020-12-21 DIAGNOSIS — J34.2 ACQUIRED DEVIATED NASAL SEPTUM: ICD-10-CM

## 2020-12-21 PROCEDURE — 99213 OFFICE O/P EST LOW 20 MIN: CPT | Performed by: OTOLARYNGOLOGY

## 2020-12-21 NOTE — PROGRESS NOTES
Obinna Ibarra Jr, MD     ENT FOLLOW UP NOTE     Chief Complaint   Patient presents with   • Follow-up     cough        HISTORY OF PRESENT ILLNESS:  Accompanied by:  No one  Arnie Ross is a  75 y.o. female who is here for follow up. She is waking with drainage. She used Atrovent and is better.  CPAP- using nightly  Cough- better overall. Mostly with waking in AM, reducing later in the day.  Not waking her at all.    Review of Systems   Constitutional: Negative for appetite change, fatigue and fever.   HENT:        See HPI   Respiratory: Negative for cough, choking and shortness of breath.    Gastrointestinal: Negative for diarrhea, nausea and vomiting.   Skin: Negative for rash.   Neurological: Negative for dizziness, light-headedness and headaches.   Psychiatric/Behavioral: Negative for sleep disturbance.     Reviewed per patient intake note and confirmed by me    Past History:  Past medical and surgical history, family history and social history reviewed and updated when appropriate.  Current medications and allergies reviewed and updated when appropriate.  Allergies:  Augmentin [amoxicillin-pot clavulanate], Codeine, and Sulfa antibiotics        Vital Signs:      EXAMINATION:  CONSTITUTIONAL:    well nourished, well-developed, alert, oriented, in no acute distress, no cough       BODY HABITUS:    Normal body habitus     COMMUNICATION:    able to communicate normally, normal voice quality     HEAD:     Normocephalic, without obvious abnormality, atraumatic     FACE:    structure normal, no tenderness present, no lesions/masses, no evidence of trauma     SALIVARY GLANDS:    parotid glands with no tenderness, no swelling, no masses, submandibular glands with normal size, nontender      EYE:    ocular motility normal, eyelids normal, orbits normal, no proptosis, conjunctiva clear, sclera non-icteric, pupils equal, round, reactive to light and accomodation  Color:   brown     HEARING:    response to  conversational voice normal     EARS:    Otoscopic exam   normal pinna with no lesions, Canals normal size and shape, Tympanic membranes normal, Ossicular chain intact, Middle ear clear     NOSE EXTERNAL:    APPEARANCE: normal, straight, with good projection, no tenderness, no lesions, no tenderness, good nasal support, patent nares     NOSE INTERNAL:    NASALMUCOSA:    abnormal:        Bilateral- bluish, less thick   NASAL PASSAGES:     abnormal   Bilateral- narrowed    NASAL VALVE:     intact, good support and no nasal obstruction   SEPTUM:     mucosa abnormal   Bilateral- bluish, thick     deviation present:      deviated Right mid mild     deviated Left low moderate   INFERIOR TURBINATES:     normal size, non-obstructing, no lesions, smaller today     ORAL CAVITY:    Normal lips with no lesions, dentition normal for age, FOM intact without lesions and normal salivary flow, Mucosa intact without lesions, Hard and soft palate normal without lesions     OROPHARYNX:    Direct examination  oropharyngeal mucosa normal, tonsil(s) with normal appearance  Slightly larger than expected without lesion     NECK:    normal appearance, no masses, no lesions, larynx normal mobility, trachea midline     LYMPH NODES :    no adenopathy     THYROID:    no overt thyromegaly, no tenderness, nodules or mass present on palpation, position midline     CHEST/RESPIRATORY:    respiratory effort normal, no rales, rubs or wheezing, no stridor, normal appearance to chest     CARDIOVASCULAR:    regular rate and rhythm, no murmurs, gallups, no peripheral edema     NEURO/PSYCHIATRIC :    oriented appropriately for age, mood normal, affect appropriate, cranial nerves intact grossly (unless specifically described), gait normal for age  PE copied from prior exam, reviewed and confirmed     PE copied from prior exam, reviewed and confirmed findings.  Any new findings and changes are appropriately documented.    RESULTS REVIEW:    No reports  available for review  I have reviewed the patients old records in the chart.           ASSESSMENT:      Diagnosis Plan   1. Cough      Improved   2. Post-nasal catarrh      improved on Atrovent   3. GERD without esophagitis      Mildly improved   4. Acquired deviated nasal septum     5. Sensorineural hearing loss (SNHL) of both ears     6. Sleep apnea in adult      Using CPAP nightly           PLAN:      Continue current management plan.  Patient has improved cough overall.  She does have small episodes especially first thing in the morning and when the weather is changing.  She will continue on her Atrovent for now.  I will refer her back to her primary care physician for refills at this point in time.  Should her cough worsen she can return for evaluation.  She will continue wearing her CPAP.  Atrovent  For drainage  Flonase BID  Nasal saline  Refer to PCP for refills  Wear CPAP nightly  MY CHART:  Patient is Patient is using My Chart          Patient understand(s) and agree(s) with the treatment plan as described.    Return if symptoms worsen or fail to improve, for Recheck couch.     Obinna Ibarra Jr, MD  12/21/20  13:23 CST

## 2020-12-21 NOTE — PATIENT INSTRUCTIONS
NASAL SALINE:  Use 2 puffs each nostril 4-6 times daily and more frequently if possible.  You can buy saline spray or you can make your own and use an old spray bottle to administer  Use a humidifier at bedside  Recipe for saline:  Water                                 1 quart  Salt (table)                        1 tablespoon  Gylcerin (or Aretha Syrup)    1 teaspoon  Sodium bicarbonate           1 teaspoon  Sprays or Roxanne pots are recommended    Nasal steroid use:  Using nasal steroids:  You will be prescribed one of the following nasal steroids: Flonase, Nasacort, Nasonex, Rhinocort, Qnasl, Zetonna  2 puffs each nostril 2 times daily  Start as soon as possible  If you are using Afrin for 3 days with the nasal steroid,  Use Afrin first and wait 10 minutes to allow the nose to open. Then administer nasal steroids.    Ipratromium Bromide (Atrovent) spray use 1-2 puffs each nostril 3-4 times daily AS needed for drainage    Call for increased coughing    See Dr Pope for refills     [email or web address]

## 2021-02-22 RX ORDER — IPRATROPIUM BROMIDE 42 UG/1
SPRAY, METERED NASAL
Qty: 15 ML | Refills: 0 | Status: SHIPPED | OUTPATIENT
Start: 2021-02-22

## (undated) DEVICE — ENDOPATH PNEUMONEEDLE INSUFFLATION NEEDLES WITH LUER LOCK CONNECTORS 120MM: Brand: ENDOPATH

## (undated) DEVICE — PAD GRND REM POLYHESIVE A/ DISP

## (undated) DEVICE — MONOPOLAR METZENBAUM SCISSOR, MINI BLADE TIP, DISPOSABLE: Brand: MONOPOLAR METZENBAUM SCISSOR, MINI BLADE TIP, DISPOSABLE

## (undated) DEVICE — PAD LAP CHOLE: Brand: MEDLINE INDUSTRIES, INC.

## (undated) DEVICE — ENDOPOUCH RETRIEVER SPECIMEN RETRIEVAL BAGS: Brand: ENDOPOUCH RETRIEVER

## (undated) DEVICE — TRY PREP SCRB VAG PVP

## (undated) DEVICE — ENDOPATH XCEL DILATING TIP TROCARS WITH STABILITY SLEEVES: Brand: ENDOPATH XCEL

## (undated) DEVICE — SUT VIC 0 SUTUPAK TIES 18IN J906G

## (undated) DEVICE — ENDOPATH ETS-FLEX45 ARTICULATING ENDOSCOPIC LINEAR CUTTER, NO RELOAD: Brand: ENDOPATH

## (undated) DEVICE — GLV SURG BIOGEL LTX PF 6 1/2

## (undated) DEVICE — ENDOPATH XCEL WITH OPTIVIEW TECHNOLOGY UNIVERSAL TROCAR STABILITY SLEEVES: Brand: ENDOPATH XCEL OPTIVIEW

## (undated) DEVICE — ENDOPATH XCEL WITH OPTIVIEW TECHNOLOGY DILATING TIP TROCARS WITH STABILITY SLEEVES: Brand: ENDOPATH XCEL OPTIVIEW

## (undated) DEVICE — ELECTRD BLD EDGE/INSUL1P 2.4X5.1MM STRL

## (undated) DEVICE — ELECTRD L HK EZ CLN 33CM

## (undated) DEVICE — TOTAL TRAY, 16FR 10ML SIL FOLEY, URN: Brand: MEDLINE

## (undated) DEVICE — 2, DISPOSABLE SUCTION/IRRIGATOR WITHOUT DISPOSABLE TIP: Brand: STRYKEFLOW

## (undated) DEVICE — CLTH CLENS READYCLEANSE PERI CARE PK/5

## (undated) DEVICE — ANTIBACTERIAL UNDYED BRAIDED (POLYGLACTIN 910), SYNTHETIC ABSORBABLE SUTURE: Brand: COATED VICRYL

## (undated) DEVICE — PK TURNOVER RM ADV